# Patient Record
Sex: FEMALE | Race: BLACK OR AFRICAN AMERICAN | NOT HISPANIC OR LATINO | Employment: OTHER | URBAN - METROPOLITAN AREA
[De-identification: names, ages, dates, MRNs, and addresses within clinical notes are randomized per-mention and may not be internally consistent; named-entity substitution may affect disease eponyms.]

---

## 2017-02-06 ENCOUNTER — PRE VISIT (OUTPATIENT)
Dept: SURGERY | Facility: CLINIC | Age: 67
End: 2017-02-06

## 2017-02-06 NOTE — TELEPHONE ENCOUNTER
1.  Date/reason for appt: 2/15/17 12PM - NRB Consult  2.  Referring provider: Dr. Cuca Voss  3.  Call to patient (Yes / No - short description): No, pt is referred  4.  Previous care at / records requested from:    - [facility] -- Op-note 12/18/06 is in Epic, surgery performed by Dr. Jerry Deluca    - [facility] -- Faxed request for records

## 2017-02-07 NOTE — TELEPHONE ENCOUNTER
Referral and 1/30/17 note received from [facility], will forward to clinic.  Labs    Waiting for additional records- upper endoscopy and CT.

## 2017-02-07 NOTE — TELEPHONE ENCOUNTER
Additional records received from [facility], will forward to clinic.  Included:  Notes on 9/7/16, 10/3/16, 10/10/16, 10/26/16, 11/10/16, 11/11/16, 11/15/16, 12/15/16, 1/13/17, 1/30/17, 2/6/17  Labs   Xray chest on 5/11/16  CT enterography on 10/18/16

## 2017-02-15 ENCOUNTER — HOSPITAL ENCOUNTER (OUTPATIENT)
Facility: CLINIC | Age: 67
Setting detail: SPECIMEN
Discharge: HOME | End: 2017-02-15
Admitting: PHYSICIAN ASSISTANT
Payer: MEDICARE

## 2017-02-15 ENCOUNTER — ALLIED HEALTH/NURSE VISIT (OUTPATIENT)
Dept: SURGERY | Facility: CLINIC | Age: 67
End: 2017-02-15

## 2017-02-15 ENCOUNTER — OFFICE VISIT (OUTPATIENT)
Dept: SURGERY | Facility: CLINIC | Age: 67
End: 2017-02-15

## 2017-02-15 VITALS
HEIGHT: 62 IN | TEMPERATURE: 98.5 F | OXYGEN SATURATION: 100 % | HEART RATE: 50 BPM | SYSTOLIC BLOOD PRESSURE: 139 MMHG | BODY MASS INDEX: 31.93 KG/M2 | DIASTOLIC BLOOD PRESSURE: 84 MMHG | WEIGHT: 173.5 LBS

## 2017-02-15 DIAGNOSIS — Z98.84 BARIATRIC SURGERY STATUS: Primary | ICD-10-CM

## 2017-02-15 LAB
ALBUMIN SERPL-MCNC: 3.7 G/DL (ref 3.4–5)
ALP SERPL-CCNC: 96 U/L (ref 40–150)
ALT SERPL W P-5'-P-CCNC: 19 U/L (ref 0–50)
ANION GAP SERPL CALCULATED.3IONS-SCNC: 10 MMOL/L (ref 3–14)
AST SERPL W P-5'-P-CCNC: 24 U/L (ref 0–45)
BILIRUB SERPL-MCNC: 0.6 MG/DL (ref 0.2–1.3)
BUN SERPL-MCNC: 19 MG/DL (ref 7–30)
CALCIUM SERPL-MCNC: 9 MG/DL (ref 8.5–10.1)
CHLORIDE SERPL-SCNC: 108 MMOL/L (ref 94–109)
CHOLEST SERPL-MCNC: 185 MG/DL
CO2 SERPL-SCNC: 26 MMOL/L (ref 20–32)
CREAT SERPL-MCNC: 0.91 MG/DL (ref 0.52–1.04)
FERRITIN SERPL-MCNC: 8 NG/ML (ref 8–252)
GFR SERPL CREATININE-BSD FRML MDRD: 62 ML/MIN/1.7M2
GLUCOSE SERPL-MCNC: 80 MG/DL (ref 70–99)
HBA1C MFR BLD: 5.6 % (ref 4.3–6)
HDLC SERPL-MCNC: 74 MG/DL
HGB BLD-MCNC: 11.5 G/DL (ref 11.7–15.7)
LDLC SERPL CALC-MCNC: 101 MG/DL
NONHDLC SERPL-MCNC: 112 MG/DL
POTASSIUM SERPL-SCNC: 4.1 MMOL/L (ref 3.4–5.3)
PREALB SERPL IA-MCNC: 16 MG/DL (ref 15–45)
PROT SERPL-MCNC: 7 G/DL (ref 6.8–8.8)
PTH-INTACT SERPL-MCNC: 120 PG/ML (ref 12–72)
SODIUM SERPL-SCNC: 143 MMOL/L (ref 133–144)
TRIGL SERPL-MCNC: 55 MG/DL
VIT B12 SERPL-MCNC: 622 PG/ML (ref 193–986)

## 2017-02-15 PROCEDURE — 82306 VITAMIN D 25 HYDROXY: CPT

## 2017-02-15 PROCEDURE — 83970 ASSAY OF PARATHORMONE: CPT

## 2017-02-15 RX ORDER — CHOLECALCIFEROL (VITAMIN D3) 50 MCG
4000 TABLET ORAL
COMMUNITY
Start: 2017-01-30

## 2017-02-15 RX ORDER — ESOMEPRAZOLE MAGNESIUM 40 MG/1
40 CAPSULE, DELAYED RELEASE ORAL
COMMUNITY
Start: 2016-11-10

## 2017-02-15 RX ORDER — CYANOCOBALAMIN 1000 UG/ML
1000 INJECTION, SOLUTION INTRAMUSCULAR; SUBCUTANEOUS
COMMUNITY
Start: 2016-08-22

## 2017-02-15 RX ORDER — METOPROLOL SUCCINATE 25 MG/1
TABLET, EXTENDED RELEASE ORAL
COMMUNITY
Start: 2017-01-10

## 2017-02-15 RX ORDER — AMLODIPINE BESYLATE 10 MG/1
10 TABLET ORAL
COMMUNITY
Start: 2016-02-04

## 2017-02-15 RX ORDER — AMOXICILLIN 250 MG
CAPSULE ORAL
COMMUNITY
Start: 2016-08-22

## 2017-02-15 RX ORDER — BENZONATATE 100 MG/1
100 CAPSULE ORAL
COMMUNITY
Start: 2016-12-15

## 2017-02-15 RX ORDER — FLUTICASONE PROPIONATE 50 MCG
2 SPRAY, SUSPENSION (ML) NASAL
COMMUNITY
Start: 2016-05-11

## 2017-02-15 RX ORDER — ACETAMINOPHEN 500 MG
500-1000 TABLET ORAL
COMMUNITY
Start: 2016-08-22

## 2017-02-15 RX ORDER — AMITRIPTYLINE HYDROCHLORIDE 10 MG/1
10 TABLET ORAL
COMMUNITY
Start: 2017-01-13 | End: 2017-03-14

## 2017-02-15 RX ORDER — SUCRALFATE 1 G/1
1 TABLET ORAL
COMMUNITY
Start: 2017-01-30 | End: 2018-05-11

## 2017-02-15 NOTE — MR AVS SNAPSHOT
MRN:[id number]                      After Visit Summary   2/15/2017    Pamella Landa    MRN: [id number]           Visit Information        Provider Department      2/15/2017 11:30 AM Gaye Ng RD [facility]        Your next 10 appointments already scheduled     Feb 15, 2017 12:00 PM CST   (Arrive by 11:45 AM)   New Bariatric Revision with CHINA Tripathi [facility] Management ([facility])    [address] [place] [id number] [phone]           Do not wear perfume.              Care Instructions    Goals:  -Limit peanuts to 1/4 cup daily.    -Continue to avoid rice, bread, and milk.    -Focus on lean protein (fish, chicken breast, eggs, low-fat string cheese).  Eat a protein source 3 times/day along with a little vegetable or fruit.    -Take a daily adult dose multivitamin (official recommendations are twice daily after a gastric bypass), but if you even took it once daily you would still be better off nutrition-wise than not taking it at all).    -Check with doctor for osteoporosis before starting, but it is recommended that you take 600 mg of Calcium citrate twice daily long-term for bone health for after a gastric bypass.           [employer] Information     [employer] is an electronic gateway that provides easy, online access to your medical records. With [employer], you can request a clinic appointment, read your test results, renew a prescription or communicate with your care team.     To sign up for [employer] visit the website at [email or web address]   You will be asked to enter the access code listed below, as well as some personal information. Please follow the directions to create your username and password.     Your access code is: [email or web address]  Expires: 2017  6:31 AM     Your access code will  in 90 days. If you need help or a new code, please contact  your [facility] or call [phone] for assistance.        Care EveryWhere ID     This is your Care EveryWhere ID. This could be used by other organizations to access your [place] medical records  [id number]

## 2017-02-15 NOTE — PATIENT INSTRUCTIONS
Goals:  -Limit peanuts to 1/4 cup daily.    -Continue to avoid rice, bread, and milk.    -Focus on lean protein (fish, chicken breast, eggs, low-fat string cheese).  Eat a protein source 3 times/day along with a little vegetable or fruit.    -Take a daily adult dose multivitamin (official recommendations are twice daily after a gastric bypass), but if you even took it once daily you would still be better off nutrition-wise than not taking it at all).    -Check with doctor for osteoporosis before starting, but it is recommended that you take 600 mg of Calcium citrate twice daily long-term for bone health for after a gastric bypass.

## 2017-02-15 NOTE — LETTER
February 22, 2017      TO: Pamella Landa  [address] [place] [address]         Dear Ms. Can Cordell,    We received and reviewed your test results done.  You may receive more than one letter if we receive the results on multiple days.  Please share all lab and test results with your primary care provider and keep a copy for your own records.      Your PTH is high and Vitamin D on the lower end of normal.  I recommend increasing your Vitamin D by 2000 IU daily and recheck in 2 months    Your B1 is low.  I recommend taking a B complex daily starting asap.      If you have any questions, feel free contact us at the Call Center [phone].      Resulted Orders   Comprehensive metabolic panel [LAB17]   Result Value Ref Range    Sodium 143 133 - 144 mmol/L    Potassium 4.1 3.4 - 5.3 mmol/L    Chloride 108 94 - 109 mmol/L    Carbon Dioxide 26 20 - 32 mmol/L    Anion Gap 10 3 - 14 mmol/L    Glucose 80 70 - 99 mg/dL    Urea Nitrogen 19 7 - 30 mg/dL    Creatinine 0.91 0.52 - 1.04 mg/dL    GFR Estimate 62 >60 mL/min/1.7m2      Comment:      Non  GFR Calc    GFR Estimate If Black 75 >60 mL/min/1.7m2      Comment:       GFR Calc    Calcium 9.0 8.5 - 10.1 mg/dL    Bilirubin Total 0.6 0.2 - 1.3 mg/dL    Albumin 3.7 3.4 - 5.0 g/dL    Protein Total 7.0 6.8 - 8.8 g/dL    Alkaline Phosphatase 96 40 - 150 U/L    ALT 19 0 - 50 U/L    AST 24 0 - 45 U/L   Hemoglobin [UOE497]   Result Value Ref Range    Hemoglobin 11.5 (L) 11.7 - 15.7 g/dL   Lipid Profile [LAB18]   Result Value Ref Range    Cholesterol 185 <200 mg/dL    Triglycerides 55 <150 mg/dL    HDL Cholesterol 74 >49 mg/dL    LDL Cholesterol Calculated 101 (H) <100 mg/dL      Comment:      Above desirable:  100-129 mg/dl   Borderline High:  130-159 mg/dL   High:             160-189 mg/dL   Very high:       >189 mg/dl      Non HDL Cholesterol 112 <130 mg/dL   Ferritin [LAB68]   Result Value Ref Range    Ferritin 8 8 - 252 ng/mL    Hemoglobin A1c [LAB90]   Result Value Ref Range    Hemoglobin A1C 5.6 4.3 - 6.0 %   Parathyroid Hormone Intact [XIZ608]   Result Value Ref Range    Parathyroid Hormone Intact 120 (H) 12 - 72 pg/mL   Prealbumin [FTW218]   Result Value Ref Range    Prealbumin 16 15 - 45 mg/dL   Vitamin A [XHV426]   Result Value Ref Range    Vitamin A 0.42       Comment:      Reference range: 0.30 to 1.20  Unit: mg/L      Retinol Palmitate <0.02  Reference range: 0.00 to 0.10  Unit: mg/L       Vitamin A Interp       Normal  (Note)  Test developed and characteristics determined by [facility]. See Compliance Statement B: [employer]  Performed by [facility],  [address] [facility] [place] [address] [phone]  [email or web address], Radu Dillon MD, Lab. Director     Vitamin D [NPY551]   Result Value Ref Range    Vitamin D Deficiency screening 28 20 - 75 ug/L      Comment:      Season, race, dietary intake, and treatment affect the concentration of   25-hydroxy-Vitamin D. Values may decrease during winter months and increase   during summer months. Values 20-29 ug/L may indicate Vitamin D insufficiency   and values <20 ug/L may indicate Vitamin D deficiency.   Vitamin D determination is routinely performed by an immunoassay specific for   25 hydroxyvitamin D3.  If an individual is on vitamin D2 (ergocalciferol)   supplementation, please specify 25 OH vitamin D2 and D3 level determination   by   LCMSMS test VITD23.     Vitamin B1 (Thiamine) [WMA569]   Result Value Ref Range    Vitamin B1 Whole Blood Level 53 (L)       Comment:      Reference range: 70 to 180  Unit: nmol/L  (Note)  INTERPRETIVE INFORMATION: Vitamin B1, Whole Blood  This assay measures the concentration of thiamine  diphosphate (TDP), the primary active form of vitamin B1.  Approximately 90 percent of vitamin B1 present in whole  blood is TDP. Thiamine and thiamine monophosphate, which  comprise the remaining 10 percent, are not measured.  Test developed and  characteristics determined by [employer]. See Compliance Statement B: [employer]  Performed by [employer] [facility],  [address] [place] [address] [phone]  www.[email or web address], Radu Dillon MD, Lab. Director     Vitamin B12 [LAB67]   Result Value Ref Range    Vitamin B12 622 193 - 986 pg/mL           Sincerely,      Amrita Adair PA-C

## 2017-02-15 NOTE — PROGRESS NOTES
"Hx of gastric bypass with Dr Deluca in 2005.  She has had epigastric pain that started last year.  PCP discontinued her ibuprofen and ASA.  She does not smoke.  She saw Dr Cochran and she has EGD scheduled soon. Pain is intermittent.  Describes it as burning sensation.   Pain is worse with some foods but sometimes food helps the pain.  Sulcralfate is helping. She is also on PPI    Dr Mcmahan [facility] GI 2/6/17:  \"She underwent a CT of abdomen and pelvis on 07/08/2016 showing duodenal stump thickening. 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 She underwent an EGD which revealed Valdemar-en-Y anatomy, otherwise unremarkable. She had a repeat CT showing very minimal improvement of the thickening of the duodenum. Her prealbumin was low. She underwent upper GI with a small bowel follow-through. Per the records, small bowel follow-through did not show anything to explain her symptoms.\"        EGD with Dr Cochran as planned.    Bariatric labs today    Continue sulcrafate and omeprazole twice daily    Follow up Amrita Adair in 1-2 months for LUIS Adair PACONNIE          "

## 2017-02-15 NOTE — PATIENT INSTRUCTIONS
EGD with Dr Cochran as planned.     Bariatric labs today     Continue sulcrafate and omeprazole twice daily     Follow up Amrita Adair in 1-2 months for RBS

## 2017-02-15 NOTE — NURSING NOTE
"(   Chief Complaint   Patient presents with     Consult     NRB, gastric bypass    )    ( Weight: 173 lb 8 oz )  ( Height: 5' 2\" )  ( BMI (Calculated): 31.8 )  ( Seminar Weight: 325 lb )  ( Seminar Wt Minus Current Wt (lbs): 151.5 )  ( Last Visits Weight: 174 lb 11.2 oz )  ( Change from Last Visit Weight (lbs): -1.2 )  (   )  (   )    ( BP: 139/84 )  ( Site: Arm, upper left )  ( Temp: 98.5  F (36.9  C) )  ( Temp src: Oral )  ( Pulse: 50 )  (   )  ( SpO2: 100 % )    ( There is no problem list on file for this patient.   )  (   Current Outpatient Prescriptions   Medication Sig Dispense Refill     acetaminophen (TYLENOL) 500 MG tablet Take 500-1,000 mg by mouth       amitriptyline (ELAVIL) 10 MG tablet Take 10 mg by mouth       amLODIPine (NORVASC) 10 MG tablet Take 10 mg by mouth       benzonatate (TESSALON) 100 MG capsule Take 100 mg by mouth       calcium carb 1250 mg, 500 mg [place],/vitamin D 200 units (OSCAL WITH D) 500-200 MG-UNIT per tablet        cetirizine HCl 10 MG CAPS        Cholecalciferol (VITAMIN D) 2000 UNITS tablet Take 4,000 Units by mouth       cyanocobalamin (VITAMIN B12) 1000 MCG/ML injection Inject 1,000 mcg into the muscle       esomeprazole (NEXIUM) 40 MG CR capsule Take 40 mg by mouth       fluticasone (FLONASE) 50 MCG/ACT spray 2 sprays       metoprolol (TOPROL-XL) 25 MG 24 hr tablet TAKE 1/2 TABLET BY MOUTH EVERY DAY       omeprazole (PRILOSEC) 20 MG CR capsule Take 20 mg by mouth       senna-docusate (SENOKOT-S;PERICOLACE) 8.6-50 MG per tablet Take 1 tab by mouth twice daily as needed for constipation       sucralfate (CARAFATE) 1 GM tablet Take 1 g by mouth      )  ( Diabetes Eval:    )    ( Pain Eval:  Data Unavailable )    ( Wound Eval:       )    (   History   Smoking Status     Never Smoker   Smokeless Tobacco     Not on file    )    ( Signed By:  Wily Cuevas; February 15, 2017; 11:54 AM )    "

## 2017-02-15 NOTE — PROGRESS NOTES
"Nutrition Assessment  Reason For Visit:  Pamella Landa is a 66 year-old female presents today for nutrition follow-up, s/p RNY GB in 2005 with Dr. Deluca.    Anthropometrics:    Height as of an earlier encounter on 2/15/17: 1.575 m (5' 2\").    Weight as of an earlier encounter on 2/15/17: 78.7 kg (173 lb 8 oz) with BMI of 31.73.    Pt stated that she has been gaining weight despite eating less food as a result of epigastric pain issues for which she has been hospitalized in the past.     Pre-op Weight: 325 lbs per Pt report  Lowest Post-Op Weight:150 lbs per Pt report  Current: 173.5 lbs with BMI of 31.73.    Current Vitamins/Minerals: Vitamin B12 injection monthly  *Pt stated she takes a green pill prescribed for osteoporosis twice daily, though she doesn't remember the name of it.     Pt is not taking MVI BID nor 600 mg Ca Citrate BID, which are recommended by ASMBS post-RNY GB.    Nutrition History:  Pt c/o poor appetite.   Pt seems to have symptoms consistent to lactose intolerance as she stated diarrhea occurs after drinking milk, but not after cheese.   Pt stated that she does not have very much rice (2 Tbsp if that) due to intolerance, which is considered normal after surgery.    Diet Recall:   AM: 1 c coffee + 1 banana  Lunch: mostly skip; occasionally eggs or steamed/baked chicken  Dinner: oatmeal; occasionally broccoli and cauliflower; occasionally eggs  Snack: peanuts as a snack all day long    Physical Activity:  Types of Activities: walking  Frequency: varies on how many days/week depending upon how good the weather is  Duration: 20-30 min    Nutrition Prescription:  Grams Protein: 60 (minimum)  Amount of Fluid: 48-64 oz    Nutrition Diagnosis  Potential for inadequate nutrient intake (protein, vitamin/mineral) related to Pt report of poor appetite, focusing on lower-protein foods, and not taking MVI and Ca as recommended post-RNY GB.    Intervention  Intervention At " Appointment:  Materials/Education provided on optimizing protein intake and vitamin/mineral supplementation guidelines post-RNY GB.  List of goals provided.     Goals:  -Limit peanuts to 1/4 cup daily.  -Continue to avoid rice, bread, and milk.  -Focus on lean protein (fish, chicken breast, eggs, low-fat string cheese).  Eat a protein source 3 times/day along with a little vegetable or fruit.  -Take a daily adult dose multivitamin (official recommendations are twice daily after a gastric bypass), but if you even took it once daily you would still be better off nutrition-wise than not taking it at all).  -Check with doctor for osteoporosis before starting, but it is recommended that you take 600 mg of Calcium citrate twice daily long-term for bone health for after a gastric bypass.    Follow-Up: PRN    Time spent with patient: 15 minutes.  Gaye Ng, RD, LD, CLT

## 2017-02-15 NOTE — LETTER
"2/15/2017       RE: Pamella Landa  [address]  [place] [address]     Dear Colleague,    Thank you for referring your patient, Pamella Landa, to the [facility] MANAGEMENT at [facility]. Please see a copy of my visit note below.    Hx of gastric bypass with Dr Deluca in 2005.  She has had epigastric pain that started last year.  PCP discontinued her ibuprofen and ASA.  She does not smoke.  She saw Dr Cochran and she has EGD scheduled soon. Pain is intermittent.  Describes it as burning sensation.   Pain is worse with some foods but sometimes food helps the pain.  Sulcralfate is helping. She is also on PPI    Dr Mcmahan [facility] 2/6/17:  \"She underwent a CT of abdomen and pelvis on 07/08/2016 showing duodenal stump thickening. 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 She underwent an EGD which revealed Valdemar-en-Y anatomy, otherwise unremarkable. She had a repeat CT showing very minimal improvement of the thickening of the duodenum. Her prealbumin was low. She underwent upper GI with a small bowel follow-through. Per the records, small bowel follow-through did not show anything to explain her symptoms.\"        EGD with Dr Cochran as planned.    Bariatric labs today    Continue sulcrafate and omeprazole twice daily    Follow up Amrita Adair in 1-2 months for LUIS Adair PA-C        "

## 2017-02-15 NOTE — MR AVS SNAPSHOT
After Visit Summary   2/15/2017    Pamella Landa    MRN: [id number]           Patient Information     Date Of Birth          1950        Visit Information        Provider Department      2/15/2017 12:00 PM Amrita Adair PA-C [facility]        Today's Diagnoses     Bariatric surgery status    -  1      Care Instructions    EGD with Dr Cochran as planned.     Bariatric labs today     Continue sulcrafate and omeprazole twice daily     Follow up Amrita Adair in 1-2 months for RBS        Follow-ups after your visit        Who to contact     Please call your clinic at [phone] to:    Ask questions about your health    Make or cancel appointments    Discuss your medicines    Learn about your test results    Speak to your doctor   If you have compliments or concerns about an experience at your clinic, or if you wish to file a complaint, please contact [facility] Patient Relations at [phone] or email us at [email or web address]         Additional Information About Your Visit        MyChart Information     [employer] is an electronic gateway that provides easy, online access to your medical records. With [employer], you can request a clinic appointment, read your test results, renew a prescription or communicate with your care team.     To sign up for [employer] visit the website at [email or web address]   You will be asked to enter the access code listed below, as well as some personal information. Please follow the directions to create your username and password.     Your access code is: [email or web address]  Expires: 2017  6:31 AM     Your access code will  in 90 days. If you need help or a new code, please contact your [facility] or call [phone] for assistance.        Care EveryWhere ID     This is your Care EveryWhere ID. This could be used by other organizations to access your  "[place] medical records  [id number]        Your Vitals Were     Pulse Temperature Height Pulse Oximetry BMI (Body Mass Index)       50 98.5  F (36.9  C) (Oral) 5' 2\" 100% 31.73 kg/m2        Blood Pressure from Last 3 Encounters:   02/15/17 139/84   08/13/14 129/72    Weight from Last 3 Encounters:   02/15/17 173 lb 8 oz   08/13/14 174 lb 11.2 oz              We Performed the Following     Comprehensive metabolic panel [LAB17]     Ferritin [LAB68]     Hemoglobin A1c [LAB90]     Hemoglobin [YAE623]     Lipid Profile [LAB18]     Parathyroid Hormone Intact [VNE262]     Prealbumin [TQS362]     Vitamin A [EXR707]     Vitamin B1 (Thiamine) [CXN164]     Vitamin B12 [LAB67]     Vitamin D [BIK123]        Primary Care Provider Office Phone # Fax #    Cuca Voss [phone]       [facility] FOR [address] [address]        Thank you!     Thank you for choosing [facility] SURGICAL WEIGHT MANAGEMENT  for your care. Our goal is always to provide you with excellent care. Hearing back from our patients is one way we can continue to improve our services. Please take a few minutes to complete the written survey that you may receive in the mail after your visit with us. Thank you!             Your Updated Medication List - Protect others around you: Learn how to safely use, store and throw away your medicines at www.disposemymeds.org.          This list is accurate as of: 2/15/17 12:20 PM.  Always use your most recent med list.                   Brand Name Dispense Instructions for use    acetaminophen 500 MG tablet    TYLENOL     Take 500-1,000 mg by mouth       amitriptyline 10 MG tablet    ELAVIL     Take 10 mg by mouth       amLODIPine 10 MG tablet    NORVASC     Take 10 mg by mouth       benzonatate 100 MG capsule    TESSALON     Take 100 mg by mouth       calcium carb 1250 mg (500 mg [place])/vitamin D 200 units 500-200 MG-UNIT per tablet    OSCAL with D         " cetirizine HCl 10 MG Caps          cyanocobalamin 1000 MCG/ML injection    VITAMIN B12     Inject 1,000 mcg into the muscle       esomeprazole 40 MG CR capsule    nexIUM     Take 40 mg by mouth       fluticasone 50 MCG/ACT spray    FLONASE     2 sprays       metoprolol 25 MG 24 hr tablet    TOPROL-XL     TAKE 1/2 TABLET BY MOUTH EVERY DAY       omeprazole 20 MG CR capsule    priLOSEC     Take 20 mg by mouth       senna-docusate 8.6-50 MG per tablet    SENOKOT-S;PERICOLACE     Take 1 tab by mouth twice daily as needed for constipation       sucralfate 1 GM tablet    CARAFATE     Take 1 g by mouth       vitamin D 2000 UNITS tablet      Take 4,000 Units by mouth

## 2017-02-16 LAB — DEPRECATED CALCIDIOL+CALCIFEROL SERPL-MC: 28 UG/L (ref 20–75)

## 2017-02-17 LAB
ANNOTATION COMMENT IMP: NORMAL
RETINYL PALMITATE SERPL-MCNC: NORMAL UG/ML
VIT A SERPL-MCNC: 0.42 UG/ML

## 2017-02-18 LAB — VIT B1 BLD-MCNC: 53 UG/DL

## 2017-02-23 ENCOUNTER — TELEPHONE (OUTPATIENT)
Dept: SURGERY | Facility: CLINIC | Age: 67
End: 2017-02-23

## 2017-02-23 NOTE — TELEPHONE ENCOUNTER
Called patient per provider request. Advised her to take B complex vitamin daily to help increase her levels of Vitamin B.

## 2017-03-27 ENCOUNTER — CARE COORDINATION (OUTPATIENT)
Dept: SURGERY | Facility: CLINIC | Age: 67
End: 2017-03-27

## 2017-03-31 ENCOUNTER — OFFICE VISIT (OUTPATIENT)
Dept: SURGERY | Facility: CLINIC | Age: 67
End: 2017-03-31

## 2017-03-31 ENCOUNTER — ALLIED HEALTH/NURSE VISIT (OUTPATIENT)
Dept: SURGERY | Facility: CLINIC | Age: 67
End: 2017-03-31

## 2017-03-31 VITALS
HEART RATE: 54 BPM | TEMPERATURE: 98.3 F | WEIGHT: 177.9 LBS | SYSTOLIC BLOOD PRESSURE: 147 MMHG | OXYGEN SATURATION: 100 % | DIASTOLIC BLOOD PRESSURE: 79 MMHG | HEIGHT: 62 IN | BODY MASS INDEX: 32.74 KG/M2

## 2017-03-31 VITALS — HEIGHT: 62 IN | WEIGHT: 177.9 LBS | BODY MASS INDEX: 32.74 KG/M2

## 2017-03-31 DIAGNOSIS — Z98.84 BARIATRIC SURGERY STATUS: Primary | ICD-10-CM

## 2017-03-31 RX ORDER — THIAMINE HYDROCHLORIDE 100 MG/ML
100 INJECTION, SOLUTION INTRAMUSCULAR; INTRAVENOUS ONCE
Qty: 1 ML | Refills: 0
Start: 2017-03-31 | End: 2017-03-31

## 2017-03-31 ASSESSMENT — ENCOUNTER SYMPTOMS
DYSPNEA ON EXERTION: 0
EYE IRRITATION: 1
EYE WATERING: 1
FEVER: 0
MUSCLE WEAKNESS: 0
LOSS OF CONSCIOUSNESS: 0
MYALGIAS: 1
PANIC: 0
DOUBLE VISION: 0
ARTHRALGIAS: 1
HALLUCINATIONS: 0
EYE PAIN: 1
SEIZURES: 0
DECREASED CONCENTRATION: 0
HEMOPTYSIS: 0
INCREASED ENERGY: 1
COUGH: 1
DEPRESSION: 1
NIGHT SWEATS: 0
HOARSE VOICE: 0
SINUS PAIN: 0
BLOATING: 1
WHEEZING: 0
TREMORS: 0
POLYDIPSIA: 0
BACK PAIN: 1
TASTE DISTURBANCE: 1
CONSTIPATION: 1
SMELL DISTURBANCE: 0
SINUS CONGESTION: 0
RECTAL BLEEDING: 0
WEIGHT GAIN: 1
SNORES LOUDLY: 0
HEADACHES: 1
COUGH DISTURBING SLEEP: 0
RECTAL PAIN: 0
NECK MASS: 0
FATIGUE: 1
CHILLS: 0
HEARTBURN: 0
ALTERED TEMPERATURE REGULATION: 1
TROUBLE SWALLOWING: 0
DIARRHEA: 0
WEIGHT LOSS: 0
ABDOMINAL PAIN: 1
POSTURAL DYSPNEA: 0
NUMBNESS: 1
POLYPHAGIA: 0
BOWEL INCONTINENCE: 0
DIZZINESS: 1
NAUSEA: 0
JAUNDICE: 0
SORE THROAT: 1
EYE REDNESS: 0
INSOMNIA: 1
NECK PAIN: 0
VOMITING: 0
WEAKNESS: 1
MUSCLE CRAMPS: 1
SHORTNESS OF BREATH: 0
DECREASED APPETITE: 0
DISTURBANCES IN COORDINATION: 0
STIFFNESS: 1
MEMORY LOSS: 0
BLOOD IN STOOL: 0
NERVOUS/ANXIOUS: 0
TINGLING: 1
SPUTUM PRODUCTION: 0
JOINT SWELLING: 0
SPEECH CHANGE: 0

## 2017-03-31 NOTE — PROGRESS NOTES
"Nutrition Reassessment  Reason For Visit:  Pamella Landa is a 67 year-old female presents today for nutrition follow-up, s/p RNY GRACE in 2005 with Dr. Deluca.  Pt referred by Amrita ALMAGUER), (3/31/17).    Anthropometrics:  Height: 62\"  Pre-op Weight: 325 lbs per Pt report  Lowest Post-Op Weight:150 lbs per Pt report    Weight as of an earlier encounter on 2/15/17: 78.7 kg (173.5 lbs) with BMI of 31.73.  Current Weight: 177.9 lbs (up 4.4 lbs over the past month)     *Pt continues to report that she has been gaining weight despite eating less food as a result of epigastric pain issues for which she has been hospitalized in the past.     Current Vitamins/Minerals: MVI BID, 600 mg Ca Citrate BID, Vitamin B12 injection monthly  *Pt stated she takes a green pill prescribed for osteoporosis twice daily, though she doesn't remember the name of it.     Nutrition History:  Pt c/o poor appetite. Her stomach hurts if she hasn't eaten for awhile per Pt report.   Pt seems to have symptoms consistent to lactose intolerance as she stated diarrhea occurs after drinking milk, but not after cheese.   Pt stated that she does not have very much rice (2 Tbsp if that) due to intolerance, which is considered normal after surgery.    Progress with Previous Goals:  -Limit peanuts to 1/4 cup daily. - Pt stopped eating nuts altogether.   -Continue to avoid rice, bread, and milk.- Meeting.   -Focus on lean protein (fish, chicken breast, eggs, low-fat string cheese).  Eat a protein source 3 times/day along with a little vegetable or fruit.- Not meeting due to poor appetite. She sometimes skips lunch.  She does not have protein at breakfast.   -Take a daily adult dose multivitamin (official recommendations are twice daily after a gastric bypass), but if you even took it once daily you would still be better off nutrition-wise than not taking it at all).- Meeting.   -Check with doctor for osteoporosis before starting, but it is recommended " that you take 600 mg of Calcium citrate twice daily long-term for bone health for after a gastric bypass.- Meeting.     Diet Recall:   AM: 1 c coffee + 1 banana  Lunch: mostly skip; occasionally eggs or steamed/baked chicken  Dinner: a fruit or a few grapes, raw vegetables (carrots, broccoli, cauliflower, celery) - Pt stated raw vegetables cause her no GI distress.   Snack: fruit (1 tangerine or orange)  Beverages: diet coke, water  (Diet Coke seems to settle her stomach per Pt report.)    Physical Activity:  Types of Activities: walking  Frequency: almost ever day (outside or in the store if too cold)  Duration: 20-30 min    Nutrition Prescription:  Grams Protein: 60 (minimum)  Amount of Fluid: 48-64 oz    Nutrition Diagnosis  Previous: Potential for inadequate nutrient intake (protein, vitamin/mineral) related to Pt report of poor appetite, focusing on lower-protein foods, and not taking MVI and Ca as recommended post-RNY GB.-not current  Current: Inadequate protein intake related to lack of appetite as evidenced by Pt report of poor protein intake (<50 g daily).    Intervention  Intervention At Appointment:  Materials/Education provided on optimizing protein intake, review of goals, attempted to determine any extra caloric intake she may not be recalling, review of guidelines post-RNY GB.  List of goals provided.  Gave encouragement and support.     Goals:  -Try a protein shake meal replacement if not hungry for a meal.  Try Muscle Milk (lactose-free).  You may try other lactose-free ones as long as they meet the following criteria: 200 or less calories, no more than 10 grams of sugar, and at least 20 grams of protein.   -Continue to avoid rice, bread, and milk.  -Focus on lean protein (fish, chicken breast, eggs, low-fat string cheese).  Eat a protein source 3 times/day along with a little vegetable or fruit.  -Continue to take your vitamin/mineral supplementation as recommended.     Follow-Up: PRN    Time spent  with patient: 30 minutes.  Gaye Ng MS, RDN, LDN, CLT  Pager: [phone]

## 2017-03-31 NOTE — MR AVS SNAPSHOT
After Visit Summary   3/31/2017    Pamella Landa    MRN: [id number]           Patient Information     Date Of Birth          1950        Visit Information        Provider Department      3/31/2017 1:45 PM Amrita Adair PA-C M [facility]        Today's Diagnoses     Bariatric surgery status    -  1       Follow-ups after your visit        Your next 10 appointments already scheduled     Mar 31, 2017  1:45 PM CDT   (Arrive by 1:30 PM)   RETURN BARIATRIC SURGERY with CHINA Tripathi [facility] ([facility])    [address]  [address] [facility] [address] [phone]              Who to contact     Please call your clinic at [phone] to:    Ask questions about your health    Make or cancel appointments    Discuss your medicines    Learn about your test results    Speak to your doctor   If you have compliments or concerns about an experience at your clinic, or if you wish to file a complaint, please contact [facility] Patient Relations at [phone] or email us at [email or web address]         Additional Information About Your Visit        MyChart Information     [employer] is an electronic gateway that provides easy, online access to your medical records. With [employer], you can request a clinic appointment, read your test results, renew a prescription or communicate with your care team.     To sign up for [employer] visit the website at [email or web address]   You will be asked to enter the access code listed below, as well as some personal information. Please follow the directions to create your username and password.     Your access code is: [email or web address]  Expires: 2017  7:31 AM     Your access code will  in 90 days. If you need help or a new code, please contact your [facility] or call [phone] for  "assistance.        Care EveryWhere ID     This is your Care EveryWhere ID. This could be used by other organizations to access your [place] medical records  [id number]        Your Vitals Were     Pulse Temperature Height Pulse Oximetry BMI (Body Mass Index)       54 98.3  F (36.8  C) (Oral) 5' 2\" 100% 32.54 kg/m2        Blood Pressure from Last 3 Encounters:   03/31/17 147/79   02/15/17 139/84   08/13/14 129/72    Weight from Last 3 Encounters:   03/31/17 177 lb 14.4 oz   03/31/17 177 lb 14.4 oz   02/15/17 173 lb 8 oz              Today, you had the following     No orders found for display         Today's Medication Changes          These changes are accurate as of: 3/31/17  1:31 PM.  If you have any questions, ask your nurse or doctor.               Start taking these medicines.        Dose/Directions    thiamine 100 MG/ML injection   Commonly known as:  B-1   Used for:  Bariatric surgery status   Started by:  Amrita Adair PA-C        Dose:  100 mg   Inject 1 mL (100 mg) into the muscle once for 1 dose   Quantity:  1 mL   Refills:  0            Where to get your medicines      Some of these will need a paper prescription and others can be bought over the counter.  Ask your nurse if you have questions.     You don't need a prescription for these medications     thiamine 100 MG/ML injection                Primary Care Provider Office Phone # Fax #    Cuca Voss [phone]       [facility] FOR [address] [address]        Thank you!     Thank you for choosing [facility]NAG[facility]  for your care. Our goal is always to provide you with excellent care. Hearing back from our patients is one way we can continue to improve our services. Please take a few minutes to complete the written survey that you may receive in the mail after your visit with us. Thank you!             Your Updated Medication List - Protect others around you: " Learn how to safely use, store and throw away your medicines at www.disposemymeds.org.          This list is accurate as of: 3/31/17  1:31 PM.  Always use your most recent med list.                   Brand Name Dispense Instructions for use    acetaminophen 500 MG tablet    TYLENOL     Take 500-1,000 mg by mouth       amitriptyline 10 MG tablet    ELAVIL     Take 10 mg by mouth       amLODIPine 10 MG tablet    NORVASC     Take 10 mg by mouth       benzonatate 100 MG capsule    TESSALON     Take 100 mg by mouth       calcium carb 1250 mg (500 mg [place])/vitamin D 200 units 500-200 MG-UNIT per tablet    OSCAL with D         cetirizine HCl 10 MG Caps          cyanocobalamin 1000 MCG/ML injection    VITAMIN B12     Inject 1,000 mcg into the muscle       esomeprazole 40 MG CR capsule    nexIUM     Take 40 mg by mouth       fluticasone 50 MCG/ACT spray    FLONASE     2 sprays       metoprolol 25 MG 24 hr tablet    TOPROL-XL     TAKE 1/2 TABLET BY MOUTH EVERY DAY       omeprazole 20 MG CR capsule    priLOSEC     Take 20 mg by mouth       senna-docusate 8.6-50 MG per tablet    SENOKOT-S;PERICOLACE     Take 1 tab by mouth twice daily as needed for constipation       sucralfate 1 GM tablet    CARAFATE     Take 1 g by mouth       thiamine 100 MG/ML injection    B-1    1 mL    Inject 1 mL (100 mg) into the muscle once for 1 dose       vitamin D 2000 UNITS tablet      Take 4,000 Units by mouth

## 2017-03-31 NOTE — PATIENT INSTRUCTIONS
Goals:  -Try a protein shake meal replacement if not hungry for a meal.  Try Muscle Milk (lactose-free).  You may try other lactose-free ones as long as they meet the following criteria: 200 or less calories, no more than 10 grams of sugar, and at least 20 grams of protein.   -Continue to avoid rice, bread, and milk.  -Focus on lean protein (fish, chicken breast, eggs, low-fat string cheese).  Eat a protein source 3 times/day along with a little vegetable or fruit.  -Continue to take your vitamin/mineral supplementation as recommended.

## 2017-03-31 NOTE — MR AVS SNAPSHOT
MRN:[id number]                      After Visit Summary   3/31/2017    Pamella Landa    MRN: [id number]           Visit Information        Provider Department      3/31/2017 1:00 PM Gaye Ng RD M [facility]        Your next 10 appointments already scheduled     Mar 31, 2017  1:00 PM CDT   NUTRITION VISIT with SHEILA Borja [facility] ([facility])    [address] [place] [address]   [phone]            Mar 31, 2017  1:45 PM CDT   (Arrive by 1:30 PM)   RETURN BARIATRIC SURGERY with CHINA Tripathi [facility] ([facility])    [address] [place] [id number] [phone]              Care Instructions    Goals:  -Try a protein shake meal replacement if not hungry for a meal.  Try Muscle Milk (lactose-free).  You may try other lactose-free ones as long as they meet the following criteria: 200 or less calories, no more than 10 grams of sugar, and at least 20 grams of protein.   -Continue to avoid rice, bread, and milk.  -Focus on lean protein (fish, chicken breast, eggs, low-fat string cheese).  Eat a protein source 3 times/day along with a little vegetable or fruit.  -Continue to take your vitamin/mineral supplementation as recommended.        SinglePlatform Information     SinglePlatform is an electronic gateway that provides easy, online access to your medical records. With SinglePlatform, you can request a clinic appointment, read your test results, renew a prescription or communicate with your care team.     To sign up for SinglePlatform visit the website at [email or web address]   You will be asked to enter the access code listed below, as well as some personal information. Please follow the directions to create your username and password.     Your access code is: [email or web address]  Expires:  2017  7:31 AM     Your access code will  in 90 days. If you need help or a new code, please contact your [facility] or call [phone] for assistance.        Care EveryWhere ID     This is your Care EveryWhere ID. This could be used by other organizations to access your [place] medical records  [id number]

## 2017-03-31 NOTE — NURSING NOTE
The following medication was given:     MEDICATION: Thiamine HCL  ROUTE: IM  SITE: Arm - Left  DOSE: 1cc  LOT #: [id number]  :  [employer]  EXPIRATION DATE:  03/2018  NDC#: 82576-025-92    Patient tolerated injection well with no complaints.  Wily Cuevas CMA

## 2017-03-31 NOTE — PROGRESS NOTES
"Return Bariatric Surgery Note    RE: Pamella Landa  MR#: [id number]  : 1950  VISIT DATE: Mar 31, 2017    Dear Cuca Voss,    I had the pleasure of seeing your patient, Pamella Landa, in my post-bariatric surgery assessment clinic.    CHIEF COMPLAINT: Post-bariatric surgery follow-up    Last visit note: \"Hx of gastric bypass with Dr Deluca in . She has had epigastric pain that started last year. PCP discontinued her ibuprofen and ASA. She does not smoke. She saw Dr Cochran and she has EGD scheduled soon. Pain is intermittent. Describes it as burning sensation. Pain is worse with some foods but sometimes food helps the pain. Sulcralfate is helping. She is also on PPI\"     [facility]  Impression:          - Normal esophagus.                       - Z-line regular, 36 cm from the incisors.                       - Gastric bypass with a normal-sized pouch and                        disrupted staple line. Gastrojejunal anastomosis                        characterized by healthy appearing mucosa.                       - The BRAVO pH capsule was deployed.                       - No specimens collected.  Recommendation:      - BRAVO protocol                       return to referring provider as planned.    Her pain has improved significantly.  Still taking omeprazole and carafate. Her appetite is still decreased.    HISTORY OF PRESENT ILLNESS:  Questions Regarding Prior Weight Loss Surgery Reviewed With Patient 3/31/2017   I had the following weight loss procedure: Valdemar-en-y Gastric Bypass   What year was your surgery? 11 years ago   How has your weight changed since your last visit? I have gained weight   Are you currently taking any weight loss medications? No     Weight History:     3/31/2017   What is your highest lifetime weight? 325   What is your lowest weight since surgery? (In pounds) 170     Initial Weight: 325 lb  Today's Weight:  Weight: 177 lb 14.4 " oz  Cumulative weight loss (lbs): 147.1   Wt Readings from Last 4 Encounters:   03/31/17 177 lb 14.4 oz   03/31/17 177 lb 14.4 oz   02/15/17 173 lb 8 oz   08/13/14 174 lb 11.2 oz         Questions Regarding Co-Morbidities and Health Concerns Reviewed With Patient 3/31/2017   Diabetes II: Improved   High Blood Pressure: Improved   High cholesterol: Improved   Heartburn/Reflux: Improved   Sleep apnea: Improved   Back pain: Improved   Joint pain: Improved   Lower leg swelling: Improved       Eating Habits 3/31/2017   How many meals do you eat per day? 2   Do you snack between meals? Yes   How much food are you eating at each meal? Less than 1/2 cup   Are you able to separate your meals and liquids by at least 30 minutes? Sometimes   Are you able to avoid liquid calories? Sometimes       Exercise Questions Reviewed With Patient 3/31/2017   How often do you exercise? 3 to 4 times per week   What is the duration of your exercise (in minutes)? 30 Minutes   What types of exercise do you do? walking   What keeps you from being more active?  Pain, My ability to walk or move around is limited       Social History:      3/31/2017   Are you smoking? No   Are you drinking alcohol? No       Medications:  Current Outpatient Prescriptions   Medication     acetaminophen (TYLENOL) 500 MG tablet     amLODIPine (NORVASC) 10 MG tablet     benzonatate (TESSALON) 100 MG capsule     calcium carb 1250 mg, 500 mg [place],/vitamin D 200 units (OSCAL WITH D) 500-200 MG-UNIT per tablet     cetirizine HCl 10 MG CAPS     Cholecalciferol (VITAMIN D) 2000 UNITS tablet     cyanocobalamin (VITAMIN B12) 1000 MCG/ML injection     esomeprazole (NEXIUM) 40 MG CR capsule     fluticasone (FLONASE) 50 MCG/ACT spray     metoprolol (TOPROL-XL) 25 MG 24 hr tablet     omeprazole (PRILOSEC) 20 MG CR capsule     senna-docusate (SENOKOT-S;PERICOLACE) 8.6-50 MG per tablet     sucralfate (CARAFATE) 1 GM tablet     amitriptyline (ELAVIL) 10 MG tablet     No current  "facility-administered medications for this visit.          3/31/2017   Do you avoid NSAIDs such as (Ibuprofen, Aleve, Naproxen, Advil)?   Yes       ROS:     3/31/2017   Vomiting: No   Diarrhea: No   Constipation: Yes   Swallowing trouble: No   Abdominal pain: Yes   Heartburn: No   Rash in skin folds: No   Depression: Yes       PHYSICAL EXAMINATION:  /79  Pulse 54  Temp 98.3  F (36.8  C) (Oral)  Ht 5' 2\"  Wt 177 lb 14.4 oz  SpO2 100%  BMI 32.54 kg/m2       ASSESSMENT AND PLAN:      1. 11 years status laparoscopic gastric bypass  2. Morbid Obesity current BMI: Body mass index is 32.54 kg/(m^2).  3. Post surgical malabsorption:   Labs ordered per protocol.   Follow food plan per dietitian recommendations.   Continue taking recommended post-op vitamins.  4. Return to clinic in 2 months.  5. Increase Vitamin D by 2000 IU  6. B1 injection today in clinic  7. Abd pain improved.  EGD and pH Bravo normal.        Sincerely,    Amrita Adair PA-C    I spent a total of 15 minutes face to face with (patient name dot phrase) during today's office visit. Over 50% of this time was spent counseling the patient and/or coordinating care.  "

## 2017-03-31 NOTE — LETTER
"3/31/2017       RE: Pamella Landa  [address]  [place] [address]     Dear Colleague,    Thank you for referring your patient, Pamella Landa, to the [facility] at [facility]. Please see a copy of my visit note below.    Return Bariatric Surgery Note    RE: Pamella Landa  MR#: [id number]  : 1950  VISIT DATE: Mar 31, 2017    Dear Cuca Voss,    I had the pleasure of seeing your patient, Pamella Landa, in my post-bariatric surgery assessment clinic.    CHIEF COMPLAINT: Post-bariatric surgery follow-up    Last visit note: \"Hx of gastric bypass with Dr Deluca in . She has had epigastric pain that started last year. PCP discontinued her ibuprofen and ASA. She does not smoke. She saw Dr Cochran and she has EGD scheduled soon. Pain is intermittent. Describes it as burning sensation. Pain is worse with some foods but sometimes food helps the pain. Sulcralfate is helping. She is also on PPI\"     Recent EGD and PH Bravo:  [facility]  Impression:          - Normal esophagus.                       - Z-line regular, 36 cm from the incisors.                       - Gastric bypass with a normal-sized pouch and                        disrupted staple line. Gastrojejunal anastomosis                        characterized by healthy appearing mucosa.                       - The BRAVO pH capsule was deployed.                       - No specimens collected.  Recommendation:      - BRAVO protocol                       return to referring provider as planned.    Her pain has improved significantly.  Still taking omeprazole and carafate. Her appetite is still decreased.    HISTORY OF PRESENT ILLNESS:  Questions Regarding Prior Weight Loss Surgery Reviewed With Patient 3/31/2017   I had the following weight loss procedure: Valdemar-en-y Gastric Bypass   What year was your surgery? 11 years ago   How has your weight changed since your last " visit? I have gained weight   Are you currently taking any weight loss medications? No     Weight History:     3/31/2017   What is your highest lifetime weight? 325   What is your lowest weight since surgery? (In pounds) 170     Initial Weight: 325 lb  Today's Weight:  Weight: 177 lb 14.4 oz  Cumulative weight loss (lbs): 147.1   Wt Readings from Last 4 Encounters:   03/31/17 177 lb 14.4 oz   03/31/17 177 lb 14.4 oz   02/15/17 173 lb 8 oz   08/13/14 174 lb 11.2 oz         Questions Regarding Co-Morbidities and Health Concerns Reviewed With Patient 3/31/2017   Diabetes II: Improved   High Blood Pressure: Improved   High cholesterol: Improved   Heartburn/Reflux: Improved   Sleep apnea: Improved   Back pain: Improved   Joint pain: Improved   Lower leg swelling: Improved       Eating Habits 3/31/2017   How many meals do you eat per day? 2   Do you snack between meals? Yes   How much food are you eating at each meal? Less than 1/2 cup   Are you able to separate your meals and liquids by at least 30 minutes? Sometimes   Are you able to avoid liquid calories? Sometimes       Exercise Questions Reviewed With Patient 3/31/2017   How often do you exercise? 3 to 4 times per week   What is the duration of your exercise (in minutes)? 30 Minutes   What types of exercise do you do? walking   What keeps you from being more active?  Pain, My ability to walk or move around is limited       Social History:      3/31/2017   Are you smoking? No   Are you drinking alcohol? No       Medications:  Current Outpatient Prescriptions   Medication     acetaminophen (TYLENOL) 500 MG tablet     amLODIPine (NORVASC) 10 MG tablet     benzonatate (TESSALON) 100 MG capsule     calcium carb 1250 mg, 500 mg [place],/vitamin D 200 units (OSCAL WITH D) 500-200 MG-UNIT per tablet     cetirizine HCl 10 MG CAPS     Cholecalciferol (VITAMIN D) 2000 UNITS tablet     cyanocobalamin (VITAMIN B12) 1000 MCG/ML injection     esomeprazole (NEXIUM) 40 MG CR capsule      "fluticasone (FLONASE) 50 MCG/ACT spray     metoprolol (TOPROL-XL) 25 MG 24 hr tablet     omeprazole (PRILOSEC) 20 MG CR capsule     senna-docusate (SENOKOT-S;PERICOLACE) 8.6-50 MG per tablet     sucralfate (CARAFATE) 1 GM tablet     amitriptyline (ELAVIL) 10 MG tablet     No current facility-administered medications for this visit.          3/31/2017   Do you avoid NSAIDs such as (Ibuprofen, Aleve, Naproxen, Advil)?   Yes       ROS:     3/31/2017   Vomiting: No   Diarrhea: No   Constipation: Yes   Swallowing trouble: No   Abdominal pain: Yes   Heartburn: No   Rash in skin folds: No   Depression: Yes       PHYSICAL EXAMINATION:  /79  Pulse 54  Temp 98.3  F (36.8  C) (Oral)  Ht 5' 2\"  Wt 177 lb 14.4 oz  SpO2 100%  BMI 32.54 kg/m2       ASSESSMENT AND PLAN:      1. 11 years status laparoscopic gastric bypass  2. Morbid Obesity current BMI: Body mass index is 32.54 kg/(m^2).  3. Post surgical malabsorption:   Labs ordered per protocol.   Follow food plan per dietitian recommendations.   Continue taking recommended post-op vitamins.  4. Return to clinic in 2 months.  5. Increase Vitamin D by 2000 IU  6. B1 injection today in clinic  7. Abd pain improved.  EGD and pH Bravo normal.      Sincerely,    Amrita Adair PA-C    I spent a total of 15 minutes face to face with (patient name dot phrase) during today's office visit. Over 50% of this time was spent counseling the patient and/or coordinating care.    "

## 2017-03-31 NOTE — NURSING NOTE
"(   Chief Complaint   Patient presents with     RECHECK     RBS 1-2 month follow up    )    ( Weight: 177 lb 14.4 oz )  ( Height: 5' 2\" )  ( BMI (Calculated): 32.61 )  ( Initial Weight: 325 lb )  ( Cumulative weight loss (lbs): 147.1 )  (   )  (   )  (   )  (   )    ( BP: 147/79 )  ( Site: Arm, upper left )  ( Temp: 98.3  F (36.8  C) )  ( Temp src: Oral )  ( Pulse: 54 )  (   )  ( SpO2: 100 % )    ( There is no problem list on file for this patient.   )  (   Current Outpatient Prescriptions   Medication Sig Dispense Refill     acetaminophen (TYLENOL) 500 MG tablet Take 500-1,000 mg by mouth       amLODIPine (NORVASC) 10 MG tablet Take 10 mg by mouth       benzonatate (TESSALON) 100 MG capsule Take 100 mg by mouth       calcium carb 1250 mg, 500 mg [place],/vitamin D 200 units (OSCAL WITH D) 500-200 MG-UNIT per tablet        cetirizine HCl 10 MG CAPS        Cholecalciferol (VITAMIN D) 2000 UNITS tablet Take 4,000 Units by mouth       cyanocobalamin (VITAMIN B12) 1000 MCG/ML injection Inject 1,000 mcg into the muscle       esomeprazole (NEXIUM) 40 MG CR capsule Take 40 mg by mouth       fluticasone (FLONASE) 50 MCG/ACT spray 2 sprays       metoprolol (TOPROL-XL) 25 MG 24 hr tablet TAKE 1/2 TABLET BY MOUTH EVERY DAY       omeprazole (PRILOSEC) 20 MG CR capsule Take 20 mg by mouth       senna-docusate (SENOKOT-S;PERICOLACE) 8.6-50 MG per tablet Take 1 tab by mouth twice daily as needed for constipation       sucralfate (CARAFATE) 1 GM tablet Take 1 g by mouth       amitriptyline (ELAVIL) 10 MG tablet Take 10 mg by mouth      )  ( Diabetes Eval:    )    ( Pain Eval:  No Pain (0) )    ( Wound Eval:       )    (   History   Smoking Status     Never Smoker   Smokeless Tobacco     Not on file    )    ( Signed By:  Wily Cuevsa; March 31, 2017; 1:08 PM )    "

## 2017-07-19 ENCOUNTER — CARE COORDINATION (OUTPATIENT)
Dept: SURGERY | Facility: CLINIC | Age: 67
End: 2017-07-19

## 2017-07-19 NOTE — PROGRESS NOTES
2 No Shows  Received: Today       Clarita Andrdae Nisha D, RN; Fidelina Land LPN       Phone Number: [phone]                     Pt called to reschedule her rbs appt with Amrita. Per our call center guidelines, it says that we cannot reschedule pts who have had 2 recent no shows in the clinic and the pt must schedule same day appts through you. If this is still correct, please call pt back to assist. If this protocol needs some updating, please let me know and I'd be happy to help.     Thank you!     [facility]       Called and left message for patient to call back to discuss scheduling clinic appointment.

## 2017-07-27 ENCOUNTER — TRANSFERRED RECORDS (OUTPATIENT)
Dept: HEALTH INFORMATION MANAGEMENT | Facility: CLINIC | Age: 67
End: 2017-07-27

## 2017-09-08 ENCOUNTER — OFFICE VISIT (OUTPATIENT)
Dept: SURGERY | Facility: CLINIC | Age: 67
End: 2017-09-08

## 2017-09-08 VITALS
TEMPERATURE: 98.5 F | BODY MASS INDEX: 34.37 KG/M2 | HEART RATE: 60 BPM | OXYGEN SATURATION: 99 % | WEIGHT: 187.9 LBS | SYSTOLIC BLOOD PRESSURE: 134 MMHG | DIASTOLIC BLOOD PRESSURE: 71 MMHG

## 2017-09-08 DIAGNOSIS — E66.09 NON MORBID OBESITY DUE TO EXCESS CALORIES: ICD-10-CM

## 2017-09-08 DIAGNOSIS — Z98.84 H/O GASTRIC BYPASS: Primary | ICD-10-CM

## 2017-09-08 DIAGNOSIS — Z98.84 H/O GASTRIC BYPASS: ICD-10-CM

## 2017-09-08 LAB
ERYTHROCYTE [DISTWIDTH] IN BLOOD BY AUTOMATED COUNT: 16.9 % (ref 10–15)
FERRITIN SERPL-MCNC: 9 NG/ML (ref 8–252)
HCT VFR BLD AUTO: 38.9 % (ref 35–47)
HGB BLD-MCNC: 11.7 G/DL (ref 11.7–15.7)
MCH RBC QN AUTO: 23.5 PG (ref 26.5–33)
MCHC RBC AUTO-ENTMCNC: 30.1 G/DL (ref 31.5–36.5)
MCV RBC AUTO: 78 FL (ref 78–100)
PLATELET # BLD AUTO: 307 10E9/L (ref 150–450)
RBC # BLD AUTO: 4.97 10E12/L (ref 3.8–5.2)
WBC # BLD AUTO: 5.8 10E9/L (ref 4–11)

## 2017-09-08 RX ORDER — TOPIRAMATE 25 MG/1
TABLET, FILM COATED ORAL
Qty: 90 TABLET | Refills: 1 | Status: SHIPPED | OUTPATIENT
Start: 2017-09-08 | End: 2017-10-26

## 2017-09-08 ASSESSMENT — PAIN SCALES - GENERAL: PAINLEVEL: NO PAIN (0)

## 2017-09-08 NOTE — LETTER
"2017       RE: Pamella Landa  [address] [place] [address]     Dear Colleague,    Thank you for referring your patient, Pamella Landa, to the [facility]NA[facility]EMENT at [facility]. Please see a copy of my visit note below.    Return Bariatric Surgery Note    RE: Pamella Landa  MR#: [id number]  : 1950  VISIT DATE: Sep 8, 2017    Dear Cuca Voss,    I had the pleasure of seeing your patient, Pamella Landa, in my post-bariatric surgery assessment clinic.    CHIEF COMPLAINT: Post-bariatric surgery follow-up    HISTORY OF PRESENT ILLNESS:  Questions Regarding Prior Weight Loss Surgery Reviewed With Patient 2017   I had the following weight loss procedure: Valdemar-en-y Gastric Bypass   What year was your surgery? 207   How has your weight changed since your last visit? I have gained weight   Are you currently taking any weight loss medications? No   Do you currently have any of the following: Sleep Apnea, Hypertension (high blood pressure)?   Have you been to the Emergency room since your last visit with us? No   Were you in the hospital since your last visit with us? Yes   Do you have any concerns today? i am gaiming weight     Last visit note: \"Hx of gastric bypass with Dr Deluca in . She has had epigastric pain that started last year. PCP discontinued her ibuprofen and ASA. She does not smoke. She saw Dr Cochran and she has EGD scheduled soon. Pain is intermittent. Describes it as burning sensation. Pain is worse with some foods but sometimes food helps the pain. Sulcralfate is helping. She is also on PPI\"      Recent EGD and PH Bravo:  [facility]  Impression:          - Normal esophagus.                       - Z-line regular, 36 cm from the incisors.                       - Gastric bypass with a normal-sized pouch and                        disrupted staple line. Gastrojejunal anastomosis                        " characterized by healthy appearing mucosa.                       - The BRAVO pH capsule was deployed.                       - No specimens collected.  Recommendation:      - BRAVO protocol                       return to referring provider as planned.     Her pain has improved significantly.  Still taking omeprazole and carafate. Her appetite is still decreased.    Weight History:     9/8/2017   What is your highest lifetime weight? 32   What is your lowest weight since surgery? (In pounds) 150     Initial Weight: 174 lb 11.2 oz  Current Weight: Weight: 187 lb 14.4 oz  Cumulative weight loss (lbs): -13.2  Last Visits Weight: 177 lb 14.4 oz    Wt Readings from Last 4 Encounters:   09/08/17 187 lb 14.4 oz   03/31/17 177 lb 14.4 oz   03/31/17 177 lb 14.4 oz   02/15/17 173 lb 8 oz       Questions Regarding Co-Morbidities and Health Concerns Reviewed With Patient 9/8/2017   Pre-diabetes: Never   Diabetes II: Never   High Blood Pressure: Improved   High cholesterol: Improved   Heartburn/Reflux: Never   Sleep apnea: Stayed the same   Do you use a CPAP? No   PCOS: Never   Back pain: Improved   Joint pain: Improved   Lower leg swelling: Never       Eating Habits 9/8/2017   How many meals do you eat per day? 2   Do you snack between meals? No   How much food are you eating at each meal? Less than 1/2 cup   Are you able to separate your meals and liquids by at least 30 minutes? Yes   Are you able to avoid liquid calories? Yes       Exercise Questions Reviewed With Patient 9/8/2017   How often do you exercise? 1 to 2 times per week   What is the duration of your exercise (in minutes)? -   What types of exercise do you do? walking   What keeps you from being more active?  I am as active as I can possbily be, My ability to walk or move around is limited       Social History:      9/8/2017   Are you smoking? No   Are you drinking alcohol? No       Medications:  Current Outpatient Prescriptions   Medication     acetaminophen  (TYLENOL) 500 MG tablet     amitriptyline (ELAVIL) 10 MG tablet     amLODIPine (NORVASC) 10 MG tablet     benzonatate (TESSALON) 100 MG capsule     calcium carb 1250 mg, 500 mg [place],/vitamin D 200 units (OSCAL WITH D) 500-200 MG-UNIT per tablet     cetirizine HCl 10 MG CAPS     Cholecalciferol (VITAMIN D) 2000 UNITS tablet     cyanocobalamin (VITAMIN B12) 1000 MCG/ML injection     esomeprazole (NEXIUM) 40 MG CR capsule     fluticasone (FLONASE) 50 MCG/ACT spray     metoprolol (TOPROL-XL) 25 MG 24 hr tablet     omeprazole (PRILOSEC) 20 MG CR capsule     senna-docusate (SENOKOT-S;PERICOLACE) 8.6-50 MG per tablet     sucralfate (CARAFATE) 1 GM tablet     No current facility-administered medications for this visit.          9/8/2017   Do you avoid NSAIDs such as (Ibuprofen, Aleve, Naproxen, Advil)?   Yes       ROS:  GI:      9/8/2017   Vomiting: No   Diarrhea: No   Constipation: No   Swallowing trouble: No   Abdominal pain: Yes   Heartburn: No   Rash in skin folds: No   Depression: No   Stress urinary incontinence No     Skin:   BAR RBS ROS - SKIN 9/8/2017   Rash in skin folds: No     Psych:      9/8/2017   Depression: No   Anxiety: No     Female Only:   BAR RBS ROS - FEMALE ONLY 9/8/2017   Female only: Loss of menstrual cycles       LABS/IMAGING/MEDICAL RECORDS REVIEW:     PHYSICAL EXAMINATION:  /71 (BP Location: Left arm, Patient Position: Chair, Cuff Size: Adult Regular)  Pulse 60  Temp 98.5  F (36.9  C)  Wt 187 lb 14.4 oz  SpO2 99%  BMI 34.37 kg/m2   General: No apparent distress  Neuro: A & O x 3  Head: Atraumatic, normocephalic  Eyes: PERRL, EOMI  Skin: warm and dry, no rashes on exposed skin  Respiratory: respirations unlabored  Abdomen: soft NT ND  Extremities: No LE swelling    ASSESSMENT AND PLAN:      1. 11 years status laparoscopic gastric bypass. She is concerned about weight regain.   2. Morbid Obesity current BMI: Body mass index is 34.37 kg/(m^2).  3. Post surgical malabsorption:   Labs  ordered per protocol.   Follow food plan per dietitian recommendations.   Continue taking recommended post-op vitamins.  4. Return to clinic in 1 year.  5. CBC and ferritin today.  If ferritin not improved from 8 will plan iron infusion  6. Weight regain. Start topiramate ramp to 75mg      MEDICATION STARTED AT THIS APPOINTMENT  We are starting topiramate at bedtime.  Start one tab, 25 mg, for a week. Go up to 50 mg (2 tabs) for the next week. At the third week, take   3 tabs (75 mg).  Stay at 3 tabs until you are seen again. Call the nurse at [phone] if you have any questions or concerns. (Do not stop taking it if you don't think it's working. For some people it works even though they do not feel much different.)    Topiramate (Topamax) is a medication that is used most often to treat migraine headaches or for seizures. It has also been found to help with weight loss. Although it's not currently FDA approved for weight loss, it has been used safely for a number of years to help people who are carrying extra weight.     Just how topiramate helps with weight loss has not been exactly determined. However it seems to work on areas of the brain to quiet down signals related to eating.      Topiramate may make you:    >feel less interest in eating in between meals   >think less about food and eating   >find it easier to push the plate away   >find giving up pop easier    >have an easier time eating less    For some of our patients, the pills work right away. They feel and think quite differently about food. Other patients don't feel much of a change but find in fact they have lost weight! Like all weight loss medications, topiramate works best when you help it work.  This means:    1) Have less tempting high calorie (fattening) food around the house or office    2) Have lower calorie food (fruits, vegetables,low fat meats and dairy) for snacks    3) Eat out only one time or less each week.   4) Eat your meals at a  table with the TV or computer off.    Side-effects. Topiramate is generally well tolerated. The main side-effects we see are:   Tingling in hands,feet, or face (usually not very troublesome)   Mental confusion and word finding trouble (about 10% of patients have this.)     Feeling sleepy or a bit dopey- this goes away very soon after starting.    One of the dangers of topiramate is the possibility of birth defects--if you get pregnant when you are on it, there is the risk that your baby will be born with a cleft lip or palate.  If you are on topiramate and of child bearing age, you need to be on a reliable form of birth control or refrain from sexual intercourse.     Please refer to the pharmacy insert for more information on side-effects. Since many pharmacists are not familiar with the use of topiramate in weight loss, calling the clinic will get you the most accurate information on the use of this medication for weight loss.     In order to get refills of this or any medication we prescribe you must be seen in the medical weight mgmt clinic every 2-3 months. Please have your pharmacy fax a refill request to [phone].      Sincerely,    Amrita Adair PA-C    I spent a total of 20 minutes face to face with Pamella during today's office visit. Over 50% of this time was spent counseling the patient and/or coordinating care.

## 2017-09-08 NOTE — NURSING NOTE
(   Chief Complaint   Patient presents with     RECHECK     RBS    )    ( Weight: 187 lb 14.4 oz )  (   )  (   )  ( Initial Weight: 174 lb 11.2 oz )  ( Cumulative weight loss (lbs): -13.2 )  ( Last Visits Weight: 177 lb 14.4 oz )  ( Wt change since last visit (lbs): 10 )  (   )  (   )    ( BP: 134/71 )  (   )  ( Temp: 98.5  F (36.9  C) )  (   )  ( Pulse: 60 )  (   )  ( SpO2: 99 % )    ( There is no problem list on file for this patient.   )  (   Current Outpatient Prescriptions   Medication Sig Dispense Refill     acetaminophen (TYLENOL) 500 MG tablet Take 500-1,000 mg by mouth       amitriptyline (ELAVIL) 10 MG tablet Take 10 mg by mouth       amLODIPine (NORVASC) 10 MG tablet Take 10 mg by mouth       benzonatate (TESSALON) 100 MG capsule Take 100 mg by mouth       calcium carb 1250 mg, 500 mg [place],/vitamin D 200 units (OSCAL WITH D) 500-200 MG-UNIT per tablet        cetirizine HCl 10 MG CAPS        Cholecalciferol (VITAMIN D) 2000 UNITS tablet Take 4,000 Units by mouth       cyanocobalamin (VITAMIN B12) 1000 MCG/ML injection Inject 1,000 mcg into the muscle       esomeprazole (NEXIUM) 40 MG CR capsule Take 40 mg by mouth       fluticasone (FLONASE) 50 MCG/ACT spray 2 sprays       metoprolol (TOPROL-XL) 25 MG 24 hr tablet TAKE 1/2 TABLET BY MOUTH EVERY DAY       omeprazole (PRILOSEC) 20 MG CR capsule Take 20 mg by mouth       senna-docusate (SENOKOT-S;PERICOLACE) 8.6-50 MG per tablet Take 1 tab by mouth twice daily as needed for constipation       sucralfate (CARAFATE) 1 GM tablet Take 1 g by mouth      )  ( Diabetes Eval:    )    ( Pain Eval:  No Pain (0) )    ( Wound Eval:       )    (   History   Smoking Status     Never Smoker   Smokeless Tobacco     Never Used    )    ( Signed By:  Jasson Ramires; September 8, 2017; 2:46 PM )

## 2017-09-08 NOTE — PATIENT INSTRUCTIONS
See Armita Adair and Dietitian in 1 month  Start topiramate ramp to 75mg    MEDICATION STARTED AT THIS APPOINTMENT  We are starting topiramate at bedtime.  Start one tab, 25 mg, for a week. Go up to 50 mg (2 tabs) for the next week. At the third week, take   3 tabs (75 mg).  Stay at 3 tabs until you are seen again. Call the nurse at [phone] if you have any questions or concerns. (Do not stop taking it if you don't think it's working. For some people it works even though they do not feel much different.)    Topiramate (Topamax) is a medication that is used most often to treat migraine headaches or for seizures. It has also been found to help with weight loss. Although it's not currently FDA approved for weight loss, it has been used safely for a number of years to help people who are carrying extra weight.     Just how topiramate helps with weight loss has not been exactly determined. However it seems to work on areas of the brain to quiet down signals related to eating.      Topiramate may make you:    >feel less interest in eating in between meals   >think less about food and eating   >find it easier to push the plate away   >find giving up pop easier    >have an easier time eating less    For some of our patients, the pills work right away. They feel and think quite differently about food. Other patients don't feel much of a change but find in fact they have lost weight! Like all weight loss medications, topiramate works best when you help it work.  This means:    1) Have less tempting high calorie (fattening) food around the house or office    2) Have lower calorie food (fruits, vegetables,low fat meats and dairy) for snacks    3) Eat out only one time or less each week.   4) Eat your meals at a table with the TV or computer off.    Side-effects. Topiramate is generally well tolerated. The main side-effects we see are:   Tingling in hands,feet, or face (usually not very troublesome)   Mental confusion and  word finding trouble (about 10% of patients have this.)     Feeling sleepy or a bit dopey- this goes away very soon after starting.    One of the dangers of topiramate is the possibility of birth defects--if you get pregnant when you are on it, there is the risk that your baby will be born with a cleft lip or palate.  If you are on topiramate and of child bearing age, you need to be on a reliable form of birth control or refrain from sexual intercourse.     Please refer to the pharmacy insert for more information on side-effects. Since many pharmacists are not familiar with the use of topiramate in weight loss, calling the clinic will get you the most accurate information on the use of this medication for weight loss.     In order to get refills of this or any medication we prescribe you must be seen in the medical weight mgmt clinic every 2-3 months. Please have your pharmacy fax a refill request to [phone].

## 2017-09-08 NOTE — PROGRESS NOTES
"Return Bariatric Surgery Note    RE: Pamella Landa  MR#: [id number]  : 1950  VISIT DATE: Sep 8, 2017    Dear Cuca oVss,    I had the pleasure of seeing your patient, Pamella Landa, in my post-bariatric surgery assessment clinic.    CHIEF COMPLAINT: Post-bariatric surgery follow-up    HISTORY OF PRESENT ILLNESS:  Questions Regarding Prior Weight Loss Surgery Reviewed With Patient 2017   I had the following weight loss procedure: Valdemar-en-y Gastric Bypass   What year was your surgery? 207   How has your weight changed since your last visit? I have gained weight   Are you currently taking any weight loss medications? No   Do you currently have any of the following: Sleep Apnea, Hypertension (high blood pressure)?   Have you been to the Emergency room since your last visit with us? No   Were you in the hospital since your last visit with us? Yes   Do you have any concerns today? i am gaiming weight     Last visit note: \"Hx of gastric bypass with Dr Deluca in . She has had epigastric pain that started last year. PCP discontinued her ibuprofen and ASA. She does not smoke. She saw Dr Cochran and she has EGD scheduled soon. Pain is intermittent. Describes it as burning sensation. Pain is worse with some foods but sometimes food helps the pain. Sulcralfate is helping. She is also on PPI\"      Recent EGD and PH Bravo:  [facility]  Impression:          - Normal esophagus.                       - Z-line regular, 36 cm from the incisors.                       - Gastric bypass with a normal-sized pouch and                        disrupted staple line. Gastrojejunal anastomosis                        characterized by healthy appearing mucosa.                       - The BRAVO pH capsule was deployed.                       - No specimens collected.  Recommendation:      - BRAVO protocol                       return to referring provider as planned.     Her pain has improved significantly.  Still " taking omeprazole and carafate. Her appetite is still decreased.    Weight History:     9/8/2017   What is your highest lifetime weight? 32   What is your lowest weight since surgery? (In pounds) 150     Initial Weight: 174 lb 11.2 oz  Current Weight: Weight: 187 lb 14.4 oz  Cumulative weight loss (lbs): -13.2  Last Visits Weight: 177 lb 14.4 oz    Wt Readings from Last 4 Encounters:   09/08/17 187 lb 14.4 oz   03/31/17 177 lb 14.4 oz   03/31/17 177 lb 14.4 oz   02/15/17 173 lb 8 oz       Questions Regarding Co-Morbidities and Health Concerns Reviewed With Patient 9/8/2017   Pre-diabetes: Never   Diabetes II: Never   High Blood Pressure: Improved   High cholesterol: Improved   Heartburn/Reflux: Never   Sleep apnea: Stayed the same   Do you use a CPAP? No   PCOS: Never   Back pain: Improved   Joint pain: Improved   Lower leg swelling: Never       Eating Habits 9/8/2017   How many meals do you eat per day? 2   Do you snack between meals? No   How much food are you eating at each meal? Less than 1/2 cup   Are you able to separate your meals and liquids by at least 30 minutes? Yes   Are you able to avoid liquid calories? Yes       Exercise Questions Reviewed With Patient 9/8/2017   How often do you exercise? 1 to 2 times per week   What is the duration of your exercise (in minutes)? -   What types of exercise do you do? walking   What keeps you from being more active?  I am as active as I can possbily be, My ability to walk or move around is limited       Social History:      9/8/2017   Are you smoking? No   Are you drinking alcohol? No       Medications:  Current Outpatient Prescriptions   Medication     acetaminophen (TYLENOL) 500 MG tablet     amitriptyline (ELAVIL) 10 MG tablet     amLODIPine (NORVASC) 10 MG tablet     benzonatate (TESSALON) 100 MG capsule     calcium carb 1250 mg, 500 mg [place],/vitamin D 200 units (OSCAL WITH D) 500-200 MG-UNIT per tablet     cetirizine HCl 10 MG CAPS     Cholecalciferol (VITAMIN D)  2000 UNITS tablet     cyanocobalamin (VITAMIN B12) 1000 MCG/ML injection     esomeprazole (NEXIUM) 40 MG CR capsule     fluticasone (FLONASE) 50 MCG/ACT spray     metoprolol (TOPROL-XL) 25 MG 24 hr tablet     omeprazole (PRILOSEC) 20 MG CR capsule     senna-docusate (SENOKOT-S;PERICOLACE) 8.6-50 MG per tablet     sucralfate (CARAFATE) 1 GM tablet     No current facility-administered medications for this visit.          9/8/2017   Do you avoid NSAIDs such as (Ibuprofen, Aleve, Naproxen, Advil)?   Yes       ROS:  GI:      9/8/2017   Vomiting: No   Diarrhea: No   Constipation: No   Swallowing trouble: No   Abdominal pain: Yes   Heartburn: No   Rash in skin folds: No   Depression: No   Stress urinary incontinence No     Skin:   BAR RBS ROS - SKIN 9/8/2017   Rash in skin folds: No     Psych:      9/8/2017   Depression: No   Anxiety: No     Female Only:   BAR RBS ROS - FEMALE ONLY 9/8/2017   Female only: Loss of menstrual cycles       LABS/IMAGING/MEDICAL RECORDS REVIEW:     PHYSICAL EXAMINATION:  /71 (BP Location: Left arm, Patient Position: Chair, Cuff Size: Adult Regular)  Pulse 60  Temp 98.5  F (36.9  C)  Wt 187 lb 14.4 oz  SpO2 99%  BMI 34.37 kg/m2   General: No apparent distress  Neuro: A & O x 3  Head: Atraumatic, normocephalic  Eyes: PERRL, EOMI  Skin: warm and dry, no rashes on exposed skin  Respiratory: respirations unlabored  Abdomen: soft NT ND  Extremities: No LE swelling    ASSESSMENT AND PLAN:      1. 11 years status laparoscopic gastric bypass. She is concerned about weight regain.   2. Morbid Obesity current BMI: Body mass index is 34.37 kg/(m^2).  3. Post surgical malabsorption:   Labs ordered per protocol.   Follow food plan per dietitian recommendations.   Continue taking recommended post-op vitamins.  4. Return to clinic in 1 year.  5. CBC and ferritin today.  If ferritin not improved from 8 will plan iron infusion  6. Weight regain. Start topiramate ramp to 75mg      MEDICATION STARTED AT  THIS APPOINTMENT  We are starting topiramate at bedtime.  Start one tab, 25 mg, for a week. Go up to 50 mg (2 tabs) for the next week. At the third week, take   3 tabs (75 mg).  Stay at 3 tabs until you are seen again. Call the nurse at [phone] if you have any questions or concerns. (Do not stop taking it if you don't think it's working. For some people it works even though they do not feel much different.)    Topiramate (Topamax) is a medication that is used most often to treat migraine headaches or for seizures. It has also been found to help with weight loss. Although it's not currently FDA approved for weight loss, it has been used safely for a number of years to help people who are carrying extra weight.     Just how topiramate helps with weight loss has not been exactly determined. However it seems to work on areas of the brain to quiet down signals related to eating.      Topiramate may make you:    >feel less interest in eating in between meals   >think less about food and eating   >find it easier to push the plate away   >find giving up pop easier    >have an easier time eating less    For some of our patients, the pills work right away. They feel and think quite differently about food. Other patients don't feel much of a change but find in fact they have lost weight! Like all weight loss medications, topiramate works best when you help it work.  This means:    1) Have less tempting high calorie (fattening) food around the house or office    2) Have lower calorie food (fruits, vegetables,low fat meats and dairy) for snacks    3) Eat out only one time or less each week.   4) Eat your meals at a table with the TV or computer off.    Side-effects. Topiramate is generally well tolerated. The main side-effects we see are:   Tingling in hands,feet, or face (usually not very troublesome)   Mental confusion and word finding trouble (about 10% of patients have this.)     Feeling sleepy or a bit dopey- this goes  away very soon after starting.    One of the dangers of topiramate is the possibility of birth defects--if you get pregnant when you are on it, there is the risk that your baby will be born with a cleft lip or palate.  If you are on topiramate and of child bearing age, you need to be on a reliable form of birth control or refrain from sexual intercourse.     Please refer to the pharmacy insert for more information on side-effects. Since many pharmacists are not familiar with the use of topiramate in weight loss, calling the clinic will get you the most accurate information on the use of this medication for weight loss.     In order to get refills of this or any medication we prescribe you must be seen in the medical weight mgmt clinic every 2-3 months. Please have your pharmacy fax a refill request to [phone].      Sincerely,    Amrita Adair PA-C    I spent a total of 20 minutes face to face with Pamella during today's office visit. Over 50% of this time was spent counseling the patient and/or coordinating care.

## 2017-09-08 NOTE — MR AVS SNAPSHOT
After Visit Summary   9/8/2017    Pamella Landa    MRN: [id number]           Patient Information     Date Of Birth          1950        Visit Information        Provider Department      9/8/2017 2:45 PM Amrita Adair PA-C [facility] Surgical Weight Management        Today's Diagnoses     H/O gastric bypass    -  1    Non morbid obesity due to excess calories          Care Instructions    See Amrita Adair and Dietitian in 1 month  Start topiramate ramp to 75mg    MEDICATION STARTED AT THIS APPOINTMENT  We are starting topiramate at bedtime.  Start one tab, 25 mg, for a week. Go up to 50 mg (2 tabs) for the next week. At the third week, take   3 tabs (75 mg).  Stay at 3 tabs until you are seen again. Call the nurse at [phone] if you have any questions or concerns. (Do not stop taking it if you don't think it's working. For some people it works even though they do not feel much different.)    Topiramate (Topamax) is a medication that is used most often to treat migraine headaches or for seizures. It has also been found to help with weight loss. Although it's not currently FDA approved for weight loss, it has been used safely for a number of years to help people who are carrying extra weight.     Just how topiramate helps with weight loss has not been exactly determined. However it seems to work on areas of the brain to quiet down signals related to eating.      Topiramate may make you:    >feel less interest in eating in between meals   >think less about food and eating   >find it easier to push the plate away   >find giving up pop easier    >have an easier time eating less    For some of our patients, the pills work right away. They feel and think quite differently about food. Other patients don't feel much of a change but find in fact they have lost weight! Like all weight loss medications, topiramate works best when you help it work.  This means:    1) Have less tempting high  calorie (fattening) food around the house or office    2) Have lower calorie food (fruits, vegetables,low fat meats and dairy) for snacks    3) Eat out only one time or less each week.   4) Eat your meals at a table with the TV or computer off.    Side-effects. Topiramate is generally well tolerated. The main side-effects we see are:   Tingling in hands,feet, or face (usually not very troublesome)   Mental confusion and word finding trouble (about 10% of patients have this.)     Feeling sleepy or a bit dopey- this goes away very soon after starting.    One of the dangers of topiramate is the possibility of birth defects--if you get pregnant when you are on it, there is the risk that your baby will be born with a cleft lip or palate.  If you are on topiramate and of child bearing age, you need to be on a reliable form of birth control or refrain from sexual intercourse.     Please refer to the pharmacy insert for more information on side-effects. Since many pharmacists are not familiar with the use of topiramate in weight loss, calling the clinic will get you the most accurate information on the use of this medication for weight loss.     In order to get refills of this or any medication we prescribe you must be seen in the medical weight mgmt clinic every 2-3 months. Please have your pharmacy fax a refill request to [phone].                  Follow-ups after your visit        Follow-up notes from your care team     Return in 4 weeks (on 10/6/2017).      Future tests that were ordered for you today     Open Future Orders        Priority Expected Expires Ordered    Ferritin Routine 9/8/2017 10/8/2017 9/8/2017    CBC with platelets Routine 9/8/2017 10/8/2017 9/8/2017            Who to contact     Please call your clinic at [phone] to:    Ask questions about your health    Make or cancel appointments    Discuss your medicines    Learn about your test results    Speak to your doctor   If you have compliments or  concerns about an experience at your clinic, or if you wish to file a complaint, please contact [facility] Patient Relations at [phone] or email us at [email or web address]         Additional Information About Your Visit        Sidewalk Information     Sidewalk is an electronic gateway that provides easy, online access to your medical records. With Sidewalk, you can request a clinic appointment, read your test results, renew a prescription or communicate with your care team.     To sign up for Sidewalk visit the website at [email or web address]   You will be asked to enter the access code listed below, as well as some personal information. Please follow the directions to create your username and password.     Your access code is: [email or web address]  Expires: 2017  6:30 AM     Your access code will  in 90 days. If you need help or a new code, please contact your [facility] Clinic or call [phone] for assistance.        Care EveryWhere ID     This is your Care EveryWhere ID. This could be used by other organizations to access your [facility] medical records  [id number]        Your Vitals Were     Pulse Temperature Pulse Oximetry BMI (Body Mass Index)          60 98.5  F (36.9  C) 99% 34.37 kg/m2         Blood Pressure from Last 3 Encounters:   17 134/71   17 147/79   02/15/17 139/84    Weight from Last 3 Encounters:   17 187 lb 14.4 oz   17 177 lb 14.4 oz   17 177 lb 14.4 oz                 Today's Medication Changes          These changes are accurate as of: 17  3:02 PM.  If you have any questions, ask your nurse or doctor.               Start taking these medicines.        Dose/Directions    topiramate 25 MG tablet   Commonly known as:  TOPAMAX   Used for:  Non morbid obesity due to excess calories   Started by:  Amrita Adair PA-C        25mg at bedtime for week 1, 50mg at bedtime for 1 week,  and 75mg at bedtime thereafter   Quantity:  90 tablet   Refills:  1            Where to get your medicines      These medications were sent to [id number] - [place], [place] - [address]  [address], [place] [address]     Phone:  [phone]     topiramate 25 MG tablet                Primary Care Provider Office Phone # Fax #    Cuca Voss [phone]       [facility] [address] [address]        Equal Access to Services     [facility]: Hadii aad ku hadasho Soomaali, waaxda luqadaha, qaybta kaalmada adeegyada, waxay tamarain hayaan jimena martinez . So [facility] [phone].    ATENCIÓN: Si habla español, tiene a burgos disposición servicios gratuitos de asistencia lingüística. [facility] [phone].    We comply with applicable federal civil rights laws and [place] laws. We do not discriminate on the basis of race, color, national origin, age, disability sex, sexual orientation or gender identity.            Thank you!     Thank you for choosing [facility]GICAL [facility]NAGEMENT  for your care. Our goal is always to provide you with excellent care. Hearing back from our patients is one way we can continue to improve our services. Please take a few minutes to complete the written survey that you may receive in the mail after your visit with us. Thank you!             Your Updated Medication List - Protect others around you: Learn how to safely use, store and throw away your medicines at www.disposemymeds.org.          This list is accurate as of: 9/8/17  3:02 PM.  Always use your most recent med list.                   Brand Name Dispense Instructions for use Diagnosis    acetaminophen 500 MG tablet    TYLENOL     Take 500-1,000 mg by mouth        amitriptyline 10 MG tablet    ELAVIL     Take 10 mg by mouth        amLODIPine 10 MG tablet    NORVASC     Take 10 mg by mouth        benzonatate 100 MG  capsule    TESSALON     Take 100 mg by mouth        calcium carb 1250 mg (500 mg [place])/vitamin D 200 units 500-200 MG-UNIT per tablet    OSCAL with D          cetirizine HCl 10 MG Caps           cyanocobalamin 1000 MCG/ML injection    VITAMIN B12     Inject 1,000 mcg into the muscle        esomeprazole 40 MG CR capsule    nexIUM     Take 40 mg by mouth        fluticasone 50 MCG/ACT spray    FLONASE     2 sprays        metoprolol 25 MG 24 hr tablet    TOPROL-XL     TAKE 1/2 TABLET BY MOUTH EVERY DAY        omeprazole 20 MG CR capsule    priLOSEC     Take 20 mg by mouth        senna-docusate 8.6-50 MG per tablet    SENOKOT-S;PERICOLACE     Take 1 tab by mouth twice daily as needed for constipation        sucralfate 1 GM tablet    CARAFATE     Take 1 g by mouth        topiramate 25 MG tablet    TOPAMAX    90 tablet    25mg at bedtime for week 1, 50mg at bedtime for 1 week, and 75mg at bedtime thereafter    Non morbid obesity due to excess calories       vitamin D 2000 UNITS tablet      Take 4,000 Units by mouth

## 2017-09-10 LAB — VIT B1 SERPL-MCNC: <2 NMOL/L (ref 4–15)

## 2017-09-11 ENCOUNTER — CARE COORDINATION (OUTPATIENT)
Dept: LAB | Facility: CLINIC | Age: 67
End: 2017-09-11

## 2017-09-11 DIAGNOSIS — E51.9 THIAMIN DEFICIENCY: Primary | ICD-10-CM

## 2017-09-11 NOTE — PROGRESS NOTES
Called and spoke to patient. She needs to have her B1 rechecked. Plasma B1 was drawn and that is not a good measurement of her body stores for B1.    Lab appt scheduled for Wednesday.

## 2017-09-12 ENCOUNTER — CARE COORDINATION (OUTPATIENT)
Dept: SURGERY | Facility: CLINIC | Age: 67
End: 2017-09-12

## 2017-09-12 PROBLEM — Z98.84 BARIATRIC SURGERY STATUS: Status: ACTIVE | Noted: 2017-09-12

## 2017-09-12 PROBLEM — K90.9 MALABSORPTION: Status: ACTIVE | Noted: 2017-09-12

## 2017-09-12 RX ORDER — DIPHENHYDRAMINE HCL 50 MG
50 CAPSULE ORAL EVERY 6 HOURS PRN
Status: CANCELLED
Start: 2017-09-12

## 2017-09-12 RX ORDER — ACETAMINOPHEN 325 MG/1
650 TABLET ORAL EVERY 4 HOURS PRN
Status: CANCELLED
Start: 2017-09-12

## 2017-09-13 ENCOUNTER — HOSPITAL ENCOUNTER (OUTPATIENT)
Facility: CLINIC | Age: 67
Setting detail: SPECIMEN
Discharge: HOME | End: 2017-09-13
Admitting: PHYSICIAN ASSISTANT
Payer: COMMERCIAL

## 2017-09-13 DIAGNOSIS — E51.9 THIAMIN DEFICIENCY: ICD-10-CM

## 2017-09-13 PROCEDURE — 36415 COLL VENOUS BLD VENIPUNCTURE: CPT | Performed by: PHYSICIAN ASSISTANT

## 2017-09-13 PROCEDURE — 84425 ASSAY OF VITAMIN B-1: CPT | Performed by: PHYSICIAN ASSISTANT

## 2017-09-15 ENCOUNTER — CARE COORDINATION (OUTPATIENT)
Dept: SURGERY | Facility: CLINIC | Age: 67
End: 2017-09-15

## 2017-09-15 DIAGNOSIS — Z98.84 HISTORY OF ROUX-EN-Y GASTRIC BYPASS: ICD-10-CM

## 2017-09-15 DIAGNOSIS — E61.1 IRON DEFICIENCY: Primary | ICD-10-CM

## 2017-09-15 NOTE — PROGRESS NOTES
Spoke with RD. Will try Vitron C (if covered) TID. Recheck her labs in 2 months.     Notified patient. She verbalized understanding.

## 2017-09-15 NOTE — PROGRESS NOTES
Patient labs indicate she needs iron infusion. Her insurance will not cover per Financial department. She would need to sign a waiver that states she will pay out of pocket if her insurance does not  any of it.     Other option is to increase her oral iron supplement. Patient would like to do this. Rx will be sent to her pharmacy.     Patient would also like to know out of pocket cost. Message sent to .

## 2017-09-16 LAB — VIT B1 BLD-MCNC: 85 NMOL/L (ref 70–180)

## 2017-09-19 ENCOUNTER — CARE COORDINATION (OUTPATIENT)
Dept: SURGERY | Facility: CLINIC | Age: 67
End: 2017-09-19

## 2017-09-19 NOTE — PROGRESS NOTES
"Called and notified patient that she should go ahead and order her iron infusion. Please see message below.     RE: iron infusion diagnosis   Received: Yesterday       Phoenix, Ethan Simmer, Nisha D, STORM                   Hi,     Absolutely! We have heard everything we need to from the patient's insurance company.     Thanks!     Ethan Phoenix    - Infusion Therapy   [facility]   [email or web address]  [email or web address]            Previous Messages       ----- Message -----      From: Pamela Will RN      Sent: 9/18/2017   1:13 PM        To: Amrita Adair PA-C, Ethan Phoenix   Subject: RE: iron infusion diagnosis                       Yes please. She is concerned about what we told her vs her insurance.     So, can I have the patient schedule the infusion?   ----- Message -----      From: Phoenix, Ethan      Sent: 9/18/2017  11:31 AM        To: Amrita Adair PA-C, *   Subject: RE: iron infusion diagnosis                       Viraj Santiago,     I spoke to [employer] insurance company this morning. Per the representative, they normally require that all medications follow FDA guidelines/usage or receive prior authorization. However there is a specific \"IV Iron Therapy\" policy which will cover Infed regardless of the diagnosis, without prior authorization.     Would you like me to call the patient to inform?     Thanks!     Ethan Phoenix    - Infusion Therapy   [facility] Center   [email or web address]  [email or web address]                  "

## 2017-10-26 ENCOUNTER — OFFICE VISIT (OUTPATIENT)
Dept: SURGERY | Facility: CLINIC | Age: 67
End: 2017-10-26

## 2017-10-26 ENCOUNTER — ALLIED HEALTH/NURSE VISIT (OUTPATIENT)
Dept: GASTROENTEROLOGY | Facility: CLINIC | Age: 67
End: 2017-10-26

## 2017-10-26 VITALS
HEIGHT: 62 IN | OXYGEN SATURATION: 99 % | DIASTOLIC BLOOD PRESSURE: 71 MMHG | HEART RATE: 62 BPM | SYSTOLIC BLOOD PRESSURE: 127 MMHG | BODY MASS INDEX: 33.88 KG/M2 | WEIGHT: 184.1 LBS

## 2017-10-26 DIAGNOSIS — E66.09 NON MORBID OBESITY DUE TO EXCESS CALORIES: ICD-10-CM

## 2017-10-26 DIAGNOSIS — D50.9 IRON DEFICIENCY ANEMIA, UNSPECIFIED IRON DEFICIENCY ANEMIA TYPE: Primary | ICD-10-CM

## 2017-10-26 DIAGNOSIS — E66.9 OBESITY: ICD-10-CM

## 2017-10-26 DIAGNOSIS — Z71.3 NUTRITIONAL COUNSELING: Primary | ICD-10-CM

## 2017-10-26 DIAGNOSIS — D50.9 IRON DEFICIENCY ANEMIA, UNSPECIFIED IRON DEFICIENCY ANEMIA TYPE: ICD-10-CM

## 2017-10-26 LAB
ERYTHROCYTE [DISTWIDTH] IN BLOOD BY AUTOMATED COUNT: 17.4 % (ref 10–15)
FERRITIN SERPL-MCNC: 11 NG/ML (ref 8–252)
HCT VFR BLD AUTO: 41 % (ref 35–47)
HGB BLD-MCNC: 12.3 G/DL (ref 11.7–15.7)
MCH RBC QN AUTO: 24.1 PG (ref 26.5–33)
MCHC RBC AUTO-ENTMCNC: 30 G/DL (ref 31.5–36.5)
MCV RBC AUTO: 80 FL (ref 78–100)
PLATELET # BLD AUTO: 256 10E9/L (ref 150–450)
RBC # BLD AUTO: 5.11 10E12/L (ref 3.8–5.2)
WBC # BLD AUTO: 4.6 10E9/L (ref 4–11)

## 2017-10-26 RX ORDER — TOPIRAMATE 25 MG/1
TABLET, FILM COATED ORAL
Qty: 90 TABLET | Refills: 3 | Status: SHIPPED | OUTPATIENT
Start: 2017-10-26 | End: 2018-05-11

## 2017-10-26 ASSESSMENT — PAIN SCALES - GENERAL: PAINLEVEL: NO PAIN (0)

## 2017-10-26 NOTE — PATIENT INSTRUCTIONS
Return to clinic in 2-3 months with Amrita Adair RBS visit    Restart topiramate ramp to 75mg    Labs today to determine if iron infusion needed.

## 2017-10-26 NOTE — LETTER
"10/26/2017       RE: Pamella Landa  [address] [place] [address]     Dear Colleague,    Thank you for referring your patient, Pamella Landa, to the [facility] SURGICAL WEIGHT MANAGEMENT at [facility]. Please see a copy of my visit note below.    Return Bariatric Surgery Note    RE: Pamella Landa  MR#: [id number]  : 1950  VISIT DATE: Oct 26, 2017    Dear Cuca Voss,    I had the pleasure of seeing your patient, Pamella Landa, in my post-bariatric surgery assessment clinic.    CHIEF COMPLAINT: Post-bariatric surgery follow-up    HISTORY OF PRESENT ILLNESS:  Questions Regarding Prior Weight Loss Surgery Reviewed With Patient 10/26/2017   I had the following weight loss procedure: Valdemar-en-y Gastric Bypass   What year was your surgery? 2000   How has your weight changed since your last visit? I have gained weight   Are you currently taking any weight loss medications? No   Do you currently have any of the following: Sleep Apnea   Have you been to the Emergency room since your last visit with us? Yes   Were you in the hospital since your last visit with us? No   Do you have any concerns today? yes becaue i am gaineng wihgt     \"Hx of gastric bypass with Dr Deluca in . She has had epigastric pain that started last year. PCP discontinued her ibuprofen and ASA. She does not smoke. She saw Dr Cochran and she has EGD scheduled soon. Pain is intermittent. Describes it as burning sensation. Pain is worse with some foods but sometimes food helps the pain. Sulcralfate is helping. She is also on PPI\"    Last visit I started topiramate but didn't take for very long because she didn't feel it was working.    Weight History:     10/26/2017   What is your highest lifetime weight? 325   What is your lowest weight since surgery? (In pounds) 150     Initial Weight: 147.4 kg (325 lb)  Current Weight: Weight: 83.5 kg (184 lb 1.6 oz)  Cumulative weight loss " (lbs): 140.9  Last Visits Weight: 79.2 kg (174 lb 11.2 oz)    Questions Regarding Co-Morbidities and Health Concerns Reviewed With Patient 10/26/2017   Pre-diabetes: Never   Diabetes II: Never   High Blood Pressure: Improved   High cholesterol: Improved   Heartburn/Reflux: Improved   Sleep apnea: Stayed the same   Do you use a CPAP? No   PCOS: Improved   Back pain: Improved   Joint pain: Improved   Lower leg swelling: Stayed the same       Eating Habits 10/26/2017   How many meals do you eat per day? 2   Do you snack between meals? No   How much food are you eating at each meal? 1/2 cup to 1 cup   Are you able to separate your meals and liquids by at least 30 minutes? Sometimes   Are you able to avoid liquid calories? Yes       Exercise Questions Reviewed With Patient 10/26/2017   How often do you exercise? 1 to 2 times per week   What is the duration of your exercise (in minutes)? -   What types of exercise do you do? -   What keeps you from being more active?  I should be more active but I just have not gotten around to it, Shortness of breath       Social History:      10/26/2017   Are you smoking? No   Are you drinking alcohol? No       Medications:  Current Outpatient Prescriptions   Medication     ferrous fumarate 65 mg, [place]. FE,-Vitamin C 125 mg (VITRON C)  MG TABS tablet     topiramate (TOPAMAX) 25 MG tablet     acetaminophen (TYLENOL) 500 MG tablet     amLODIPine (NORVASC) 10 MG tablet     benzonatate (TESSALON) 100 MG capsule     calcium carb 1250 mg, 500 mg [place],/vitamin D 200 units (OSCAL WITH D) 500-200 MG-UNIT per tablet     cetirizine HCl 10 MG CAPS     Cholecalciferol (VITAMIN D) 2000 UNITS tablet     cyanocobalamin (VITAMIN B12) 1000 MCG/ML injection     esomeprazole (NEXIUM) 40 MG CR capsule     fluticasone (FLONASE) 50 MCG/ACT spray     metoprolol (TOPROL-XL) 25 MG 24 hr tablet     omeprazole (PRILOSEC) 20 MG CR capsule     senna-docusate (SENOKOT-S;PERICOLACE) 8.6-50 MG per tablet      "sucralfate (CARAFATE) 1 GM tablet     amitriptyline (ELAVIL) 10 MG tablet     No current facility-administered medications for this visit.          10/26/2017   Do you avoid NSAIDs such as (Ibuprofen, Aleve, Naproxen, Advil)?   Yes       ROS:  GI:      10/26/2017   Vomiting: No   Diarrhea: No   Constipation: Yes   Swallowing trouble: No   Abdominal pain: Yes   Heartburn: No   Rash in skin folds: No   Depression: Yes   Stress urinary incontinence Yes     Skin:   BAR RBS ROS - SKIN 10/26/2017   Rash in skin folds: No     Psych:      10/26/2017   Depression: Yes   Anxiety: Yes     Female Only:   SARA RBS ROS - FEMALE ONLY 10/26/2017   Female only: None of the above       LABS/IMAGING/MEDICAL RECORDS REVIEW:     PHYSICAL EXAMINATION:  /71 (BP Location: Left arm, Patient Position: Chair, Cuff Size: Adult Regular)  Pulse 62  Ht 1.575 m (5' 2\")  Wt 83.5 kg (184 lb 1.6 oz)  SpO2 99%  BMI 33.67 kg/m2   General: No apparent distress  Neuro: A & O x 3  Head: Atraumatic, normocephalic  Eyes: PERRL, EOMI  Skin: warm and dry, no rashes on exposed skin  Respiratory: respirations unlabored  Abdomen: soft NT ND   Extremities: No LE swelling      ASSESSMENT AND PLAN:      1. 11 years status post laparoscopic gastric bypass.  Iron deficiency anemia. She is taking oral iron to see if this helps before having iron infusion. GERD she previously had is improved.  2. Morbid Obesity current BMI: Body mass index is 33.67 kg/(m^2).  3. Post surgical malabsorption:   Labs ordered per protocol.   Follow food plan per dietitian recommendations.   Continue taking recommended post-op vitamins.  4. Return to clinic in 2-3 months with Amrita Adair  5. Restart topiramate ramp to 75mg  6. Labs today to determine if iron infusion needed.  7. GERD much improved on omeprazole and carafate.         Sincerely,    Amrita Adair PA-C    I spent a total of 15 minutes face to face with Pamella during today's office visit. Over 50% of this " time was spent counseling the patient and/or coordinating care.    Again, thank you for allowing me to participate in the care of your patient.      Sincerely,    Amrita Adair PA-C

## 2017-10-26 NOTE — MR AVS SNAPSHOT
After Visit Summary   10/26/2017    Pamella Landa    MRN: [id number]           Patient Information     Date Of Birth          1950        Visit Information        Provider Department      10/26/2017 3:00 PM Cristina Reed RD [facility]        Care Instructions    It was a pleasure meeting you today:    1. Eat 3 small balanced meals per day with lean protein at all meals. Eat protein foods first then vegetables, fruit and if room whole grain starches/healthy starches. (i.e of protein serving size ~1/2 c cottage cheese, 6 oz greek yogurt, 2-3 oz of lean meat/fish).  2. Try a protein shake meal replacement if not hungry for a meal.  Try Muscle Milk (lactose-free).  You may try other lactose-free ones as long as they meet the following criteria: 200 or less calories, no more than 10 grams of sugar, and at least 20 grams of protein.   3. Increase water to drink at least 3-4 bottles (6-8 cups per day).            Follow-ups after your visit        Your next 10 appointments already scheduled     Oct 26, 2017  3:00 PM CDT   NUTRITION VISIT with SHEILA [facility] ([facility] [place])    [address] [place] [id number] [phone]            Oct 27, 2017  2:00 PM CDT   (Arrive by 1:45 PM)   NUTRITION VISIT with SHEILA Borja [facility] ([facility] [place])    [address] [place] [id number] [phone]              Future tests that were ordered for you today     Open Future Orders        Priority Expected Expires Ordered    Ferritin Routine  10/27/2018 10/26/2017            Who to contact     Please call your clinic at [phone] to:    Ask questions about your health    Make or cancel appointments    Discuss your medicines    Learn about your test results    Speak to your doctor   If you have  compliments or concerns about an experience at your clinic, or if you wish to file a complaint, please contact [facility] Patient Relations at [phone] or email us at [email or web address]         Additional Information About Your Visit        Move[employer] Information     [employer] is an electronic gateway that provides easy, online access to your medical records. With [employer], you can request a clinic appointment, read your test results, renew a prescription or communicate with your care team.     To sign up for [employer] visit the website at [email or web address]   You will be asked to enter the access code listed below, as well as some personal information. Please follow the directions to create your username and password.     Your access code is: [email or web address]  Expires: 2017  6:30 AM     Your access code will  in 90 days. If you need help or a new code, please contact your [facility] Clinic or call [phone] for assistance.        Care EveryWhere ID     This is your Care EveryWhere ID. This could be used by other organizations to access your [facility] medical records  [id number]         Blood Pressure from Last 3 Encounters:   10/26/17 127/71   17 134/71   17 147/79    Weight from Last 3 Encounters:   10/26/17 83.5 kg (184 lb 1.6 oz)   17 85.2 kg (187 lb 14.4 oz)   17 80.7 kg (177 lb 14.4 oz)              Today, you had the following     No orders found for display         Where to get your medicines      These medications were sent to [id number] - [place], [place] - [address] AT [facility] [address]  [address], [place] [address]     Phone:  [phone]     topiramate 25 MG tablet          Primary Care Provider Office Phone # Fax #    Cuca Voss [phone]       [facility] [place] [address] [address] [facility] [address]        Equal Access  to Services     LEAH GIFFORD : Carson Parks, wanaz mckenna, qabaileymarcy valdezbongsivan henry. So [facility] [place] [facility] [phone].    ATENCIÓN: Si nirmal jane, tiene a burgos disposición servicios gratuitos de asistencia lingüística. Llame al [phone].    We comply with applicable federal civil rights laws and [place] laws. We do not discriminate on the basis of race, color, national origin, age, disability, sex, sexual orientation, or gender identity.            Thank you!     Thank you for choosing [facility]  for your care. Our goal is always to provide you with excellent care. Hearing back from our patients is one way we can continue to improve our services. Please take a few minutes to complete the written survey that you may receive in the mail after your visit with us. Thank you!             Your Updated Medication List - Protect others around you: Learn how to safely use, store and throw away your medicines at www.disposemymeds.org.          This list is accurate as of: 10/26/17  2:36 PM.  Always use your most recent med list.                   Brand Name Dispense Instructions for use Diagnosis    acetaminophen 500 MG tablet    TYLENOL     Take 500-1,000 mg by mouth        amitriptyline 10 MG tablet    ELAVIL     Take 10 mg by mouth        amLODIPine 10 MG tablet    NORVASC     Take 10 mg by mouth        benzonatate 100 MG capsule    TESSALON     Take 100 mg by mouth        calcium carb 1250 mg (500 mg [place])/vitamin D 200 units 500-200 MG-UNIT per tablet    OSCAL with D          cetirizine HCl 10 MG Caps           cyanocobalamin 1000 MCG/ML injection    VITAMIN B12     Inject 1,000 mcg into the muscle        esomeprazole 40 MG CR capsule    nexIUM     Take 40 mg by mouth        ferrous fumarate 65 mg ([place]. FE)-Vitamin C 125 mg  MG Tabs tablet    VITRON C    90 tablet    Take 1 tablet by mouth 3 times daily    Iron deficiency,  History of Valdemar-en-Y gastric bypass       fluticasone 50 MCG/ACT spray    FLONASE     2 sprays        metoprolol 25 MG 24 hr tablet    TOPROL-XL     TAKE 1/2 TABLET BY MOUTH EVERY DAY        omeprazole 20 MG CR capsule    priLOSEC     Take 20 mg by mouth        senna-docusate 8.6-50 MG per tablet    SENOKOT-S;PERICOLACE     Take 1 tab by mouth twice daily as needed for constipation        sucralfate 1 GM tablet    CARAFATE     Take 1 g by mouth        topiramate 25 MG tablet    TOPAMAX    90 tablet    25mg at bedtime for week 1, 50mg at bedtime for 1 week, and 75mg at bedtime thereafter    Non morbid obesity due to excess calories       vitamin D 2000 UNITS tablet      Take 4,000 Units by mouth

## 2017-10-26 NOTE — PROGRESS NOTES
"Return Bariatric Surgery Note    RE: Pamella Landa  MR#: [id number]  : 1950  VISIT DATE: Oct 26, 2017    Dear Cuca Voss,    I had the pleasure of seeing your patient, Pamella Landa, in my post-bariatric surgery assessment clinic.    CHIEF COMPLAINT: Post-bariatric surgery follow-up    HISTORY OF PRESENT ILLNESS:  Questions Regarding Prior Weight Loss Surgery Reviewed With Patient 10/26/2017   I had the following weight loss procedure: Valdemar-en-y Gastric Bypass   What year was your surgery? 2000   How has your weight changed since your last visit? I have gained weight   Are you currently taking any weight loss medications? No   Do you currently have any of the following: Sleep Apnea   Have you been to the Emergency room since your last visit with us? Yes   Were you in the hospital since your last visit with us? No   Do you have any concerns today? yes yumiko i am gaineng wihgt     \"Hx of gastric bypass with Dr Deluca in . She has had epigastric pain that started last year. PCP discontinued her ibuprofen and ASA. She does not smoke. She saw Dr Cochran and she has EGD scheduled soon. Pain is intermittent. Describes it as burning sensation. Pain is worse with some foods but sometimes food helps the pain. Sulcralfate is helping. She is also on PPI\"    Last visit I started topiramate but didn't take for very long because she didn't feel it was working.    Weight History:     10/26/2017   What is your highest lifetime weight? 325   What is your lowest weight since surgery? (In pounds) 150     Initial Weight: 147.4 kg (325 lb)  Current Weight: Weight: 83.5 kg (184 lb 1.6 oz)  Cumulative weight loss (lbs): 140.9  Last Visits Weight: 79.2 kg (174 lb 11.2 oz)    Questions Regarding Co-Morbidities and Health Concerns Reviewed With Patient 10/26/2017   Pre-diabetes: Never   Diabetes II: Never   High Blood Pressure: Improved   High cholesterol: Improved   Heartburn/Reflux: Improved   Sleep apnea: Stayed " the same   Do you use a CPAP? No   PCOS: Improved   Back pain: Improved   Joint pain: Improved   Lower leg swelling: Stayed the same       Eating Habits 10/26/2017   How many meals do you eat per day? 2   Do you snack between meals? No   How much food are you eating at each meal? 1/2 cup to 1 cup   Are you able to separate your meals and liquids by at least 30 minutes? Sometimes   Are you able to avoid liquid calories? Yes       Exercise Questions Reviewed With Patient 10/26/2017   How often do you exercise? 1 to 2 times per week   What is the duration of your exercise (in minutes)? -   What types of exercise do you do? -   What keeps you from being more active?  I should be more active but I just have not gotten around to it, Shortness of breath       Social History:      10/26/2017   Are you smoking? No   Are you drinking alcohol? No       Medications:  Current Outpatient Prescriptions   Medication     ferrous fumarate 65 mg, [place]. FE,-Vitamin C 125 mg (VITRON C)  MG TABS tablet     topiramate (TOPAMAX) 25 MG tablet     acetaminophen (TYLENOL) 500 MG tablet     amLODIPine (NORVASC) 10 MG tablet     benzonatate (TESSALON) 100 MG capsule     calcium carb 1250 mg, 500 mg [place],/vitamin D 200 units (OSCAL WITH D) 500-200 MG-UNIT per tablet     cetirizine HCl 10 MG CAPS     Cholecalciferol (VITAMIN D) 2000 UNITS tablet     cyanocobalamin (VITAMIN B12) 1000 MCG/ML injection     esomeprazole (NEXIUM) 40 MG CR capsule     fluticasone (FLONASE) 50 MCG/ACT spray     metoprolol (TOPROL-XL) 25 MG 24 hr tablet     omeprazole (PRILOSEC) 20 MG CR capsule     senna-docusate (SENOKOT-S;PERICOLACE) 8.6-50 MG per tablet     sucralfate (CARAFATE) 1 GM tablet     amitriptyline (ELAVIL) 10 MG tablet     No current facility-administered medications for this visit.          10/26/2017   Do you avoid NSAIDs such as (Ibuprofen, Aleve, Naproxen, Advil)?   Yes       ROS:  GI:      10/26/2017   Vomiting: No   Diarrhea: No   Constipation:  "Yes   Swallowing trouble: No   Abdominal pain: Yes   Heartburn: No   Rash in skin folds: No   Depression: Yes   Stress urinary incontinence Yes     Skin:   SARA SMITH ROS - SKIN 10/26/2017   Rash in skin folds: No     Psych:      10/26/2017   Depression: Yes   Anxiety: Yes     Female Only:   BAR RBS ROS - FEMALE ONLY 10/26/2017   Female only: None of the above       LABS/IMAGING/MEDICAL RECORDS REVIEW:     PHYSICAL EXAMINATION:  /71 (BP Location: Left arm, Patient Position: Chair, Cuff Size: Adult Regular)  Pulse 62  Ht 1.575 m (5' 2\")  Wt 83.5 kg (184 lb 1.6 oz)  SpO2 99%  BMI 33.67 kg/m2   General: No apparent distress  Neuro: A & O x 3  Head: Atraumatic, normocephalic  Eyes: PERRL, EOMI  Skin: warm and dry, no rashes on exposed skin  Respiratory: respirations unlabored  Abdomen: soft NT ND   Extremities: No LE swelling      ASSESSMENT AND PLAN:      1. 11 years status post laparoscopic gastric bypass.  Iron deficiency anemia. She is taking oral iron to see if this helps before having iron infusion. GERD she previously had is improved.  2. Morbid Obesity current BMI: Body mass index is 33.67 kg/(m^2).  3. Post surgical malabsorption:   Labs ordered per protocol.   Follow food plan per dietitian recommendations.   Continue taking recommended post-op vitamins.  4. Return to clinic in 2-3 months with Amrita Adair  5. Restart topiramate ramp to 75mg  6. Labs today to determine if iron infusion needed.  7. GERD much improved on omeprazole and carafate.         Sincerely,    Amrita Adair PA-C    I spent a total of 15 minutes face to face with Pamella during today's office visit. Over 50% of this time was spent counseling the patient and/or coordinating care.  "

## 2017-10-26 NOTE — PROGRESS NOTES
[facility] Outpatient Medical Nutrition Therapy      Time Spent:  30 minutes  Session Type: Follow up Individual Session  Referring Physician:  Amrita dAair  Reason for RD Visit:   Nutritional counseling, weight manangement     Nutrition Assessment:  Patient is here for follow up visit with Registered Dietitian (RD).  Patient is a 67 y.o. female with history of s/p RNY GB in 2005 with Dr. Deluca. Patient stated that she has been feeling tired especially over the past couple of weeks and reported d/t iron deficiency/hx Fe deficiency. Stated that she tends to skip one meal per day or sometimes two meals per day (lunch and/or dinner). Does not typically snack but occasional has fruit or ~1T peanut butter. Eats a very small breakfast meal. Is not consistently eating balanced meals and not consistently eating protein at all meals. Does not tolerate liquid milk but does tolerate greek yogurt and cheese. See diet recall below.    Progress towards previous goals:   -Try a protein shake meal replacement if not hungry for a meal.  Try Muscle Milk (lactose-free).  You may try other lactose-free ones as long as they meet the following criteria: 200 or less calories, no more than 10 grams of sugar, and at least 20 grams of protein. -Not Met  -Continue to avoid rice, bread, and milk. -Mostly (occasionally has 2T brown rice)  -Focus on lean protein (fish, chicken breast, eggs, low-fat string cheese).  Eat a protein source 3 times/day along with a little vegetable or fruit.-Not meeting  -Continue to take your vitamin/mineral supplementation as recommended. -Meeting    Diet Recall:  (yesterday OR other usual meals/snacks):   Meal Food    Breakfast 2T cottage cheese and banana   Lunch stewed liver with small sweet potato OR skip   Dinner Skip OR Bread OR small cereal with milk OR 2 oz fish with steamed peppers and onions OR 2T brown rice and stew or greens and rice   Snacks Usually None OR fruit OR 1T peanut butter   Beverages 1/2 c  "decaf coffee with cream and 1 tsp brown sugar, 16-50 oz water, 1/2 can caffeine free diet soda    Alcohol Intake denies     Frequency of eating/taking out meals: very seldom. Does not like fast food or eating out.     Height:   Ht Readings from Last 1 Encounters:   10/26/17 1.575 m (5' 2\")     Weight:  Pre-op weight: 325# and lowest post op weight was 150#. Weight up ~6# since last RD visit on 3/31/2017.  Wt Readings from Last 10 Encounters:   10/26/17 83.5 kg (184 lb 1.6 oz)   09/08/17 85.2 kg (187 lb 14.4 oz)   03/31/17 80.7 kg (177 lb 14.4 oz)   03/31/17 80.7 kg (177 lb 14.4 oz)   02/15/17 78.7 kg (173 lb 8 oz)   08/13/14 79.2 kg (174 lb 11.2 oz)      BMI: 33.74 class I obesity  Labs:  Reviewed. Patient reported iron deficiency. Hgb and hematocrit WNL today (10/26/17).  Pertinent Medications/vitamin and mineral supplements:   Vitamin B12 shot, prescription D. Was on MVI but PCP decreased down to 1x/week due to it made you feel more hungry. Took a iron pill prescribed by her doctor but reported it was not working for her so now liquid iron from home country ([facility]) called [employer].   Food Allergies:  NKFA  Physical Activity:  Loves to walk. With good weather and when feeling better does about 2 weeks ago 2-3 miles (2x 45 minutes). None over the past two weeks.    Previous Nutrition Diagnosis:    Inadequate protein intake related to lack of appetite as evidenced by Pt report of poor protein intake (<50 g daily).-Ongoing (and current).    Nutrition Prescription: Recommended general healthful post op bariatric diet of 3 small balanced meals per day with adequate protein at all.    Nutrition Intervention:    Nutrition Education/Counseling:  Provided diet education review related to s/p RNY. Reviewed diet recall and discussed tips and suggestions for meals. Recommended consistently eating 3 small balanced meals with adequate protein at all, 60g each day, no skipping meals. Reviewed protein sources and recommended " portion sizes. Told patient if unable to eat a meal then okay to replace a meal with a protein shake. Reviewed criteria for protein drinks: 200 or less calories, no more than 10 grams of sugar, and at least 20 grams of protein. Encouraged patient to increase water/non caloric/non caffeinated/non carbonated fluids to get at least 64 oz (8 cups) per day. Patient verbalized understanding of education provided. See all recommendations under Goals below.    Educational Materials Provided:  Sources of Protein     Goals:  1. Eat 3 small balanced meals per day with lean protein at all meals. Eat protein foods first then vegetables, fruit and if room whole grain starches/healthy starches. (i.e of protein serving size ~1/2 c cottage cheese, 6 oz greek yogurt, 2-3 oz of lean meat/fish).  2. Try a protein shake meal replacement if not hungry for a meal.  Try Muscle Milk (lactose-free).  You may try other lactose-free ones as long as they meet the following criteria: 200 or less calories, no more than 10 grams of sugar, and at least 20 grams of protein.   3. Increase water to drink at least 3-4 bottles (6-8 cups per day).    Nutrition Monitoring and Evaluation: Will monitor adherence to nutrition recommendations at any future RD visits.     Further Medical Nutrition Therapy: PRN  Next Appointment (if applicable):  Patient considering f/u RD when f/u with PA-C  Patient was encouraged to call/contact RD with any further questions.    Cristina Reed, MS, RD, LD

## 2017-10-26 NOTE — PATIENT INSTRUCTIONS
It was a pleasure meeting you today:    1. Eat 3 small balanced meals per day with lean protein at all meals. Eat protein foods first then vegetables, fruit and if room whole grain starches/healthy starches. (i.e of protein serving size ~1/2 c cottage cheese, 6 oz greek yogurt, 2-3 oz of lean meat/fish).  2. Try a protein shake meal replacement if not hungry for a meal.  Try Muscle Milk (lactose-free).  You may try other lactose-free ones as long as they meet the following criteria: 200 or less calories, no more than 10 grams of sugar, and at least 20 grams of protein.   3. Increase water to drink at least 3-4 bottles (6-8 cups per day).

## 2017-10-26 NOTE — MR AVS SNAPSHOT
After Visit Summary   10/26/2017    Pamella Landa    MRN: [id number]           Patient Information     Date Of Birth          1950        Visit Information        Provider Department      10/26/2017 1:30 PM Amrita Adair PA-C [facility] Surgical Weight Management        Today's Diagnoses     Iron deficiency anemia, unspecified iron deficiency anemia type    -  1    Non morbid obesity due to excess calories          Care Instructions    Return to clinic in 2-3 months with Amrita Adair RBS visit    Restart topiramate ramp to 75mg    Labs today to determine if iron infusion needed.            Follow-ups after your visit        Your next 10 appointments already scheduled     Oct 27, 2017  2:00 PM CDT   (Arrive by 1:45 PM)   NUTRITION VISIT with SHEILA Borja [facility] Surgical Weight Management ([facility])    [address] [place] [address] [phone]              Future tests that were ordered for you today     Open Future Orders        Priority Expected Expires Ordered    Ferritin Routine  10/27/2018 10/26/2017            Who to contact     Please call your clinic at [phone] to:    Ask questions about your health    Make or cancel appointments    Discuss your medicines    Learn about your test results    Speak to your doctor   If you have compliments or concerns about an experience at your clinic, or if you wish to file a complaint, please contact [facility] Patient Relations at [phone] or email us at [email or web address]         Additional Information About Your Visit        MyChart Information     SocialThreadert is an electronic gateway that provides easy, online access to your medical records. With IZEA, you can request a clinic appointment, read your test results, renew a prescription or communicate with your care team.     To sign up for IZEA visit the website at  "[email or web address]   You will be asked to enter the access code listed below, as well as some personal information. Please follow the directions to create your username and password.     Your access code is: [email or web address]  Expires: 2017  6:30 AM     Your access code will  in 90 days. If you need help or a new code, please contact your [facility] or call [phone] for assistance.        Care EveryWhere ID     This is your Care EveryWhere ID. This could be used by other organizations to access your [place] medical records  [id number]        Your Vitals Were     Pulse Height Pulse Oximetry BMI (Body Mass Index)          62 1.575 m (5' 2\") 99% 33.67 kg/m2         Blood Pressure from Last 3 Encounters:   10/26/17 127/71   17 134/71   17 147/79    Weight from Last 3 Encounters:   10/26/17 83.5 kg (184 lb 1.6 oz)   17 85.2 kg (187 lb 14.4 oz)   17 80.7 kg (177 lb 14.4 oz)              We Performed the Following     CBC with platelets          Where to get your medicines      These medications were sent to [id number] - [place], [place] - [address] [place] [address] AT [facility] [address]  [address], [place] [address]     Phone:  [phone]     topiramate 25 MG tablet          Primary Care Provider Office Phone # Fax #    Cuca Voss [phone]       [facility] FOR [place] [address] [address] [facility] [address]        Equal Access to Services     LEAH GIFFORD : Hadarnaldo Parks, larry mckenna, sivan tom. So [facility] [phone].    ATENCIÓN: Si habla español, tiene a burgos disposición servicios gratuitos de asistencia lingüística. Llame al [phone].    We comply with applicable federal civil rights laws and [place] laws. We do not discriminate on the basis of race, color, national origin, age, disability, sex, sexual " orientation, or gender identity.            Thank you!     Thank you for choosing [facility] SURGICAL WEIGHT MANAGEMENT  for your care. Our goal is always to provide you with excellent care. Hearing back from our patients is one way we can continue to improve our services. Please take a few minutes to complete the written survey that you may receive in the mail after your visit with us. Thank you!             Your Updated Medication List - Protect others around you: Learn how to safely use, store and throw away your medicines at www.disposemymeds.org.          This list is accurate as of: 10/26/17  1:46 PM.  Always use your most recent med list.                   Brand Name Dispense Instructions for use Diagnosis    acetaminophen 500 MG tablet    TYLENOL     Take 500-1,000 mg by mouth        amitriptyline 10 MG tablet    ELAVIL     Take 10 mg by mouth        amLODIPine 10 MG tablet    NORVASC     Take 10 mg by mouth        benzonatate 100 MG capsule    TESSALON     Take 100 mg by mouth        calcium carb 1250 mg (500 mg [place])/vitamin D 200 units 500-200 MG-UNIT per tablet    OSCAL with D          cetirizine HCl 10 MG Caps           cyanocobalamin 1000 MCG/ML injection    VITAMIN B12     Inject 1,000 mcg into the muscle        esomeprazole 40 MG CR capsule    nexIUM     Take 40 mg by mouth        ferrous fumarate 65 mg ([place]. FE)-Vitamin C 125 mg  MG Tabs tablet    VITRON C    90 tablet    Take 1 tablet by mouth 3 times daily    Iron deficiency, History of Valdemar-en-Y gastric bypass       fluticasone 50 MCG/ACT spray    FLONASE     2 sprays        metoprolol 25 MG 24 hr tablet    TOPROL-XL     TAKE 1/2 TABLET BY MOUTH EVERY DAY        omeprazole 20 MG CR capsule    priLOSEC     Take 20 mg by mouth        senna-docusate 8.6-50 MG per tablet    SENOKOT-S;PERICOLACE     Take 1 tab by mouth twice daily as needed for constipation        sucralfate 1 GM tablet    CARAFATE     Take 1 g by mouth        topiramate 25 MG  tablet    TOPAMAX    90 tablet    25mg at bedtime for week 1, 50mg at bedtime for 1 week, and 75mg at bedtime thereafter    Non morbid obesity due to excess calories       vitamin D 2000 UNITS tablet      Take 4,000 Units by mouth

## 2018-05-11 ENCOUNTER — OFFICE VISIT (OUTPATIENT)
Dept: SURGERY | Facility: CLINIC | Age: 68
End: 2018-05-11
Payer: COMMERCIAL

## 2018-05-11 VITALS
DIASTOLIC BLOOD PRESSURE: 70 MMHG | OXYGEN SATURATION: 100 % | WEIGHT: 170.6 LBS | BODY MASS INDEX: 31.39 KG/M2 | HEIGHT: 62 IN | HEART RATE: 48 BPM | TEMPERATURE: 98.3 F | SYSTOLIC BLOOD PRESSURE: 132 MMHG

## 2018-05-11 DIAGNOSIS — E66.09 NON MORBID OBESITY DUE TO EXCESS CALORIES: ICD-10-CM

## 2018-05-11 RX ORDER — NYSTATIN 100000 U/G
CREAM TOPICAL
COMMUNITY
Start: 2018-01-24

## 2018-05-11 RX ORDER — ONDANSETRON 4 MG/1
TABLET, ORALLY DISINTEGRATING ORAL
COMMUNITY
Start: 2018-03-06

## 2018-05-11 RX ORDER — PANTOPRAZOLE SODIUM 20 MG/1
20 TABLET, DELAYED RELEASE ORAL
COMMUNITY
Start: 2018-03-13

## 2018-05-11 RX ORDER — TOPIRAMATE 25 MG/1
50 TABLET, FILM COATED ORAL 2 TIMES DAILY
Qty: 120 TABLET | Refills: 3 | Status: SHIPPED | OUTPATIENT
Start: 2018-05-11 | End: 2019-11-14

## 2018-05-11 RX ORDER — TRAZODONE HYDROCHLORIDE 50 MG/1
TABLET, FILM COATED ORAL
COMMUNITY
Start: 2018-02-06

## 2018-05-11 RX ORDER — SUMATRIPTAN 50 MG/1
50 TABLET, FILM COATED ORAL
COMMUNITY

## 2018-05-11 NOTE — MR AVS SNAPSHOT
After Visit Summary   2018    Pamella Landa    MRN: [id number]           Patient Information     Date Of Birth          1950        Visit Information        Provider Department      2018 8:45 AM Amrita Adair PA-C [facility]        Today's Diagnoses     Non morbid obesity due to excess calories           Follow-ups after your visit        Your next 10 appointments already scheduled     May 11, 2018  8:45 AM CDT   (Arrive by 8:30 AM)   RETURN BARIATRIC SURGERY with CHINA Tripathi [facility] Surgical Weight Management ([facility])    [address] [facility]  [address] [facility] [address] [phone]              Who to contact     Please call your clinic at [phone] to:    Ask questions about your health    Make or cancel appointments    Discuss your medicines    Learn about your test results    Speak to your doctor            Additional Information About Your Visit        MyChart Information     Relux is an electronic gateway that provides easy, online access to your medical records. With Relux, you can request a clinic appointment, read your test results, renew a prescription or communicate with your care team.     To sign up for [employer] Found[employer] visit the website at [email or web address]   You will be asked to enter the access code listed below, as well as some personal information. Please follow the directions to create your username and password.     Your access code is: [email or web address]  Expires: 2018  6:31 AM     Your access code will  in 90 days. If you need help or a new code, please contact your [facility] or call [phone] for assistance.        Care EveryWhere ID     This is your Care EveryWhere ID. This could be used by other organizations to access your [place] medical records  [id number]        Your Vitals Were     Pulse Temperature Height  "Pulse Oximetry BMI (Body Mass Index)       48 98.3  F (36.8  C) (Oral) 5' 2\" 100% 31.2 kg/m2        Blood Pressure from Last 3 Encounters:   05/11/18 132/70   10/26/17 127/71   09/08/17 134/71    Weight from Last 3 Encounters:   05/11/18 170 lb 9.6 oz   10/26/17 184 lb 1.6 oz   09/08/17 187 lb 14.4 oz              Today, you had the following     No orders found for display         Today's Medication Changes          These changes are accurate as of 5/11/18  8:33 AM.  If you have any questions, ask your nurse or doctor.               These medicines have changed or have updated prescriptions.        Dose/Directions    topiramate 25 MG tablet   Commonly known as:  TOPAMAX   This may have changed:    - how much to take  - how to take this  - when to take this  - additional instructions   Used for:  Non morbid obesity due to excess calories   Changed by:  Amrita Adair PA-C        Dose:  50 mg   Take 2 tablets (50 mg) by mouth 2 times daily   Quantity:  120 tablet   Refills:  3         Stop taking these medicines if you haven't already. Please contact your care team if you have questions.     sucralfate 1 GM tablet   Commonly known as:  CARAFATE   Stopped by:  Amrita Adair PA-C                Where to get your medicines      These medications were sent to [id number] - [place], [place] - [address] AT [facility] [address]  [address], [place] [address]     Phone:  [phone]     topiramate 25 MG tablet                Primary Care Provider Office Phone # Fax #    Cuca Voss [phone]       [facility] FOR [place] [address] [address] [facility] [address]        Equal Access to Services     LEAH GIFFORD AH: Carson Parks, larry mckenna, qagriselda kaalmakev augustine, sivan osullivan. So [facility] [phone].    ATENCIÓN: Si habla español, tiene a burgos disposición servicios gratuitos de asistencia " lingüística. Carlos al [phone].    We comply with applicable federal civil rights laws and [place] laws. We do not discriminate on the basis of race, color, national origin, age, disability, sex, sexual orientation, or gender identity.            Thank you!     Thank you for choosing [facility] SURGICAL WEIGHT MANAGEMENT  for your care. Our goal is always to provide you with excellent care. Hearing back from our patients is one way we can continue to improve our services. Please take a few minutes to complete the written survey that you may receive in the mail after your visit with us. Thank you!             Your Updated Medication List - Protect others around you: Learn how to safely use, store and throw away your medicines at www.disposemymeds.org.          This list is accurate as of 5/11/18  8:33 AM.  Always use your most recent med list.                   Brand Name Dispense Instructions for use Diagnosis    acetaminophen 500 MG tablet    TYLENOL     Take 500-1,000 mg by mouth        amitriptyline 10 MG tablet    ELAVIL     Take 10 mg by mouth        amLODIPine 10 MG tablet    NORVASC     Take 10 mg by mouth        benzonatate 100 MG capsule    TESSALON     Take 100 mg by mouth        Calcium carb-Vitamin D 500 mg [place]-200 units 500-200 MG-UNIT per tablet    OSCAL with D;Oyster Shell Calcium          cetirizine HCl 10 MG Caps           cyanocobalamin 1000 MCG/ML injection    VITAMIN B12     Inject 1,000 mcg into the muscle        esomeprazole 40 MG CR capsule    nexIUM     Take 40 mg by mouth        ferrous fumarate 65 mg ([place]. FE)-Vitamin C 125 mg  MG Tabs tablet    VITRON C    90 tablet    Take 1 tablet by mouth 3 times daily    Iron deficiency, History of Valdemar-en-Y gastric bypass       FLUoxetine 20 MG capsule    PROzac     Take 20 mg by mouth        fluticasone 50 MCG/ACT spray    FLONASE     2 sprays        melatonin 1 MG Tabs tablet      Take 1 mg by mouth        metoprolol succinate 25 MG 24  hr tablet    TOPROL-XL     TAKE 1/2 TABLET BY MOUTH EVERY DAY        nystatin cream    MYCOSTATIN     Apply to area under breasts twice daily for one week        ondansetron 4 MG ODT tab    ZOFRAN-ODT     I-2 tab BID PRN        pantoprazole 20 MG EC tablet    PROTONIX     Take 20 mg by mouth        senna-docusate 8.6-50 MG per tablet    SENOKOT-S;PERICOLACE     Take 1 tab by mouth twice daily as needed for constipation        SUMAtriptan 50 MG tablet    IMITREX     Take 50 mg by mouth        topiramate 25 MG tablet    TOPAMAX    120 tablet    Take 2 tablets (50 mg) by mouth 2 times daily    Non morbid obesity due to excess calories       traZODone 50 MG tablet    DESYREL     1-2 tablets Qhs  PRN        vitamin D 2000 units tablet      Take 4,000 Units by mouth

## 2018-05-11 NOTE — LETTER
"2018       RE: Pamella Landa  [address] [place] [address]     Dear Colleague,    Thank you for referring your patient, Pamella Landa, to the [facility] SURGICAL WEIGHT MANAGEMENT at [facility]. Please see a copy of my visit note below.        Return Bariatric Surgery Note    RE: Pamella Landa  MR#: [id number]  : 1950  VISIT DATE: May 11, 2018    Dear Cuca Voss,    I had the pleasure of seeing your patient, Pamella Landa, in my post-bariatric surgery assessment clinic.    CHIEF COMPLAINT: Post-bariatric surgery follow-up    HISTORY OF PRESENT ILLNESS:  Questions Regarding Prior Weight Loss Surgery Reviewed With Patient 2018   I had the following weight loss procedure: Valdemar-en-y Gastric Bypass   What year was your surgery? 12 years ago   How has your weight changed since your last visit? I have gained weight   Are you currently taking any weight loss medications? No   Do you currently have any of the following: Sleep Apnea, Hypertension (high blood pressure)?   Have you been to the Emergency room since your last visit with us? Yes   Were you in the hospital since your last visit with us? Yes   Do you have any concerns today? yes aout my michael      Last visit was 10/26/17 and she has lost 14 lbs since then.  Last visit we restarted topiramate. She is tolerating 75mg at night and no side effects.  She is unsure how she lost the 14 lbs since she doesn't think she has made any food changes.  She doesn't feel hungry in between meals.      \"Hx of gastric bypass with Dr Deluca in . She has had epigastric pain that started last year. PCP discontinued her ibuprofen and ASA. She does not smoke. She saw Dr Cochran and she has EGD scheduled soon. Pain is intermittent. Describes it as burning sensation. Pain is worse with some foods but sometimes food helps the pain. Sulcralfate is helping. She is also on PPI\"    Weight History:     " 5/11/2018   What is your highest lifetime weight? 325   What is your lowest weight since surgery? (In pounds) 160     Initial Weight: 325 lb  Current Weight: Weight: 170 lb 9.6 oz  Cumulative weight loss (lbs): 154.4  Last Visits Weight: 184 lb 1.6 oz    Questions Regarding Co-Morbidities and Health Concerns Reviewed With Patient 5/11/2018   Pre-diabetes: Never   Diabetes II: Never   High Blood Pressure: Improved   High cholesterol: Improved   Heartburn/Reflux: Improved   Sleep apnea: Stayed the same   Do you use a CPAP? No   PCOS: Never   Back pain: Improved   Joint pain: Improved   Lower leg swelling: Improved       Eating Habits 5/11/2018   How many meals do you eat per day? 2   Do you snack between meals? No   How much food are you eating at each meal? Less than 1/2 cup   Are you able to separate your meals and liquids by at least 30 minutes? Sometimes   Are you able to avoid liquid calories? Yes       Exercise Questions Reviewed With Patient 5/11/2018   How often do you exercise? Daily   What is the duration of your exercise (in minutes)? 30 Minutes   What types of exercise do you do? walking   What keeps you from being more active?  I am as active as I can possbily be, Pain, My ability to walk or move around is limited, Too tired       Social History:      5/11/2018   Are you smoking? No   Are you drinking alcohol? No       Medications:  Current Outpatient Prescriptions   Medication     acetaminophen (TYLENOL) 500 MG tablet     amitriptyline (ELAVIL) 10 MG tablet     amLODIPine (NORVASC) 10 MG tablet     benzonatate (TESSALON) 100 MG capsule     calcium carb 1250 mg, 500 mg [place],/vitamin D 200 units (OSCAL WITH D) 500-200 MG-UNIT per tablet     cetirizine HCl 10 MG CAPS     Cholecalciferol (VITAMIN D) 2000 UNITS tablet     cyanocobalamin (VITAMIN B12) 1000 MCG/ML injection     esomeprazole (NEXIUM) 40 MG CR capsule     ferrous fumarate 65 mg, [employer]. FE,-Vitamin C 125 mg (VITRON C)  MG TABS tablet      "FLUoxetine (PROZAC) 20 MG capsule     fluticasone (FLONASE) 50 MCG/ACT spray     melatonin 1 MG TABS tablet     metoprolol (TOPROL-XL) 25 MG 24 hr tablet     nystatin (MYCOSTATIN) cream     ondansetron (ZOFRAN-ODT) 4 MG ODT tab     pantoprazole (PROTONIX) 20 MG EC tablet     senna-docusate (SENOKOT-S;PERICOLACE) 8.6-50 MG per tablet     sucralfate (CARAFATE) 1 GM tablet     SUMAtriptan (IMITREX) 50 MG tablet     topiramate (TOPAMAX) 25 MG tablet     traZODone (DESYREL) 50 MG tablet     No current facility-administered medications for this visit.          5/11/2018   Do you avoid NSAIDs such as (Ibuprofen, Aleve, Naproxen, Advil)?   Yes       ROS:  GI:      5/11/2018   Vomiting: No   Diarrhea: No   Constipation: No   Swallowing trouble: No   Abdominal pain: Yes   Heartburn: No   Rash in skin folds: Yes   Depression: Yes   Stress urinary incontinence No     Skin:   BAR RBS ROS - SKIN 5/11/2018   Rash in skin folds: Yes     Psych:      5/11/2018   Depression: Yes   Anxiety: Yes     Female Only:   BAR RBS ROS - FEMALE ONLY 5/11/2018   Female only: None of the above       LABS/IMAGING/MEDICAL RECORDS REVIEW:     PHYSICAL EXAMINATION:  /70  Pulse (!) 48  Temp 98.3  F (36.8  C) (Oral)  Ht 5' 2\"  Wt 170 lb 9.6 oz  SpO2 100%  BMI 31.2 kg/m2   General: No apparent distress  Neuro: A & O x 3  Head: Atraumatic, normocephalic  Eyes: PERRL, EOMI  Skin: warm and dry, no rashes on exposed skin  Respiratory: respirations unlabored  Abdomen: soft NT ND  Extremities: No LE swelling      ASSESSMENT AND PLAN:      1. 11.5 years status laparoscopic gastric bypass  2. Morbid Obesity current BMI: Body mass index is 31.2 kg/(m^2).  3. Post surgical malabsorption:   Labs ordered per protocol.   Follow food plan per dietitian recommendations.   Continue taking recommended post-op vitamins.  4. Return to clinic in 4 months.  5. Increase topiramate to 50 mg twice daily.  6. Continue oral iron  7. Check ferritin and HgB next " visit      Sincerely,    Amrita Adair PA-C    I spent a total of 15 minutes face to face with Pamella during today's office visit. Over 50% of this time was spent counseling the patient and/or coordinating care.    Again, thank you for allowing me to participate in the care of your patient.      Sincerely,    Amrita Adair PA-C

## 2018-05-11 NOTE — PROGRESS NOTES
"    Return Bariatric Surgery Note    RE: Pamella Landa  MR#: [id number]  : 1950  VISIT DATE: May 11, 2018    Dear Cuca Voss,    I had the pleasure of seeing your patient, Pamella Landa, in my post-bariatric surgery assessment clinic.    CHIEF COMPLAINT: Post-bariatric surgery follow-up    HISTORY OF PRESENT ILLNESS:  Questions Regarding Prior Weight Loss Surgery Reviewed With Patient 2018   I had the following weight loss procedure: Valdemar-en-y Gastric Bypass   What year was your surgery? 12 years ago   How has your weight changed since your last visit? I have gained weight   Are you currently taking any weight loss medications? No   Do you currently have any of the following: Sleep Apnea, Hypertension (high blood pressure)?   Have you been to the Emergency room since your last visit with us? Yes   Were you in the hospital since your last visit with us? Yes   Do you have any concerns today? yes aout my michael      Last visit was 10/26/17 and she has lost 14 lbs since then.  Last visit we restarted topiramate. She is tolerating 75mg at night and no side effects.  She is unsure how she lost the 14 lbs since she doesn't think she has made any food changes.  She doesn't feel hungry in between meals.      \"Hx of gastric bypass with Dr Deluca in . She has had epigastric pain that started last year. PCP discontinued her ibuprofen and ASA. She does not smoke. She saw Dr Cochran and she has EGD scheduled soon. Pain is intermittent. Describes it as burning sensation. Pain is worse with some foods but sometimes food helps the pain. Sulcralfate is helping. She is also on PPI\"    Weight History:     2018   What is your highest lifetime weight? 325   What is your lowest weight since surgery? (In pounds) 160     Initial Weight: 325 lb  Current Weight: Weight: 170 lb 9.6 oz  Cumulative weight loss (lbs): 154.4  Last Visits Weight: 184 lb 1.6 oz    Questions Regarding Co-Morbidities and Health " Concerns Reviewed With Patient 5/11/2018   Pre-diabetes: Never   Diabetes II: Never   High Blood Pressure: Improved   High cholesterol: Improved   Heartburn/Reflux: Improved   Sleep apnea: Stayed the same   Do you use a CPAP? No   PCOS: Never   Back pain: Improved   Joint pain: Improved   Lower leg swelling: Improved       Eating Habits 5/11/2018   How many meals do you eat per day? 2   Do you snack between meals? No   How much food are you eating at each meal? Less than 1/2 cup   Are you able to separate your meals and liquids by at least 30 minutes? Sometimes   Are you able to avoid liquid calories? Yes       Exercise Questions Reviewed With Patient 5/11/2018   How often do you exercise? Daily   What is the duration of your exercise (in minutes)? 30 Minutes   What types of exercise do you do? walking   What keeps you from being more active?  I am as active as I can possbily be, Pain, My ability to walk or move around is limited, Too tired       Social History:      5/11/2018   Are you smoking? No   Are you drinking alcohol? No       Medications:  Current Outpatient Prescriptions   Medication     acetaminophen (TYLENOL) 500 MG tablet     amitriptyline (ELAVIL) 10 MG tablet     amLODIPine (NORVASC) 10 MG tablet     benzonatate (TESSALON) 100 MG capsule     calcium carb 1250 mg, 500 mg [place],/vitamin D 200 units (OSCAL WITH D) 500-200 MG-UNIT per tablet     cetirizine HCl 10 MG CAPS     Cholecalciferol (VITAMIN D) 2000 UNITS tablet     cyanocobalamin (VITAMIN B12) 1000 MCG/ML injection     esomeprazole (NEXIUM) 40 MG CR capsule     ferrous fumarate 65 mg, [place]. FE,-Vitamin C 125 mg (VITRON C)  MG TABS tablet     FLUoxetine (PROZAC) 20 MG capsule     fluticasone (FLONASE) 50 MCG/ACT spray     melatonin 1 MG TABS tablet     metoprolol (TOPROL-XL) 25 MG 24 hr tablet     nystatin (MYCOSTATIN) cream     ondansetron (ZOFRAN-ODT) 4 MG ODT tab     pantoprazole (PROTONIX) 20 MG EC tablet     senna-docusate  "(SENOKOT-S;PERICOLACE) 8.6-50 MG per tablet     sucralfate (CARAFATE) 1 GM tablet     SUMAtriptan (IMITREX) 50 MG tablet     topiramate (TOPAMAX) 25 MG tablet     traZODone (DESYREL) 50 MG tablet     No current facility-administered medications for this visit.          5/11/2018   Do you avoid NSAIDs such as (Ibuprofen, Aleve, Naproxen, Advil)?   Yes       ROS:  GI:      5/11/2018   Vomiting: No   Diarrhea: No   Constipation: No   Swallowing trouble: No   Abdominal pain: Yes   Heartburn: No   Rash in skin folds: Yes   Depression: Yes   Stress urinary incontinence No     Skin:   BAR RBS ROS - SKIN 5/11/2018   Rash in skin folds: Yes     Psych:      5/11/2018   Depression: Yes   Anxiety: Yes     Female Only:   BAR RBS ROS - FEMALE ONLY 5/11/2018   Female only: None of the above       LABS/IMAGING/MEDICAL RECORDS REVIEW:     PHYSICAL EXAMINATION:  /70  Pulse (!) 48  Temp 98.3  F (36.8  C) (Oral)  Ht 5' 2\"  Wt 170 lb 9.6 oz  SpO2 100%  BMI 31.2 kg/m2   General: No apparent distress  Neuro: A & O x 3  Head: Atraumatic, normocephalic  Eyes: PERRL, EOMI  Skin: warm and dry, no rashes on exposed skin  Respiratory: respirations unlabored  Abdomen: soft NT ND  Extremities: No LE swelling      ASSESSMENT AND PLAN:      1. 11.5 years status laparoscopic gastric bypass  2. Morbid Obesity current BMI: Body mass index is 31.2 kg/(m^2).  3. Post surgical malabsorption:   Labs ordered per protocol.   Follow food plan per dietitian recommendations.   Continue taking recommended post-op vitamins.  4. Return to clinic in 4 months.  5. Increase topiramate to 50 mg twice daily.  6. Continue oral iron  7. Check ferritin and HgB next visit      Sincerely,    Amrita Adair PA-C    I spent a total of 15 minutes face to face with Pamella during today's office visit. Over 50% of this time was spent counseling the patient and/or coordinating care.  "

## 2018-05-14 RX ORDER — TOPIRAMATE 25 MG/1
50 TABLET, FILM COATED ORAL 2 TIMES DAILY
Qty: 360 TABLET | Refills: 0 | OUTPATIENT
Start: 2018-05-14

## 2019-10-16 DIAGNOSIS — E66.09 NON MORBID OBESITY DUE TO EXCESS CALORIES: ICD-10-CM

## 2019-10-19 RX ORDER — TOPIRAMATE 25 MG/1
TABLET, FILM COATED ORAL
Qty: 120 TABLET | Refills: 0 | OUTPATIENT
Start: 2019-10-19

## 2019-10-19 NOTE — TELEPHONE ENCOUNTER
Recieved refill request for topiramate (TOPAMAX) 25 MG tablet . Patient needs appointment scheduled prior to any refills. Clinic Coordinator notified and will follow up with the patient as appropriate. The pharmacy has been notified that the medication will not be refilled prior to an appointment being scheduled.    Scheduling has been notified to contact the pt for appointment.  FYI to Clinic Nurse

## 2019-11-14 ENCOUNTER — OFFICE VISIT (OUTPATIENT)
Dept: ENDOCRINOLOGY | Facility: CLINIC | Age: 69
End: 2019-11-14
Payer: COMMERCIAL

## 2019-11-14 VITALS
HEART RATE: 67 BPM | BODY MASS INDEX: 34.37 KG/M2 | HEIGHT: 62 IN | SYSTOLIC BLOOD PRESSURE: 131 MMHG | OXYGEN SATURATION: 100 % | TEMPERATURE: 98.7 F | DIASTOLIC BLOOD PRESSURE: 73 MMHG | WEIGHT: 186.8 LBS

## 2019-11-14 DIAGNOSIS — Z98.84 BARIATRIC SURGERY STATUS: Primary | ICD-10-CM

## 2019-11-14 DIAGNOSIS — K90.9 INTESTINAL MALABSORPTION, UNSPECIFIED TYPE: ICD-10-CM

## 2019-11-14 DIAGNOSIS — E66.09 NON MORBID OBESITY DUE TO EXCESS CALORIES: ICD-10-CM

## 2019-11-14 DIAGNOSIS — Z98.84 BARIATRIC SURGERY STATUS: ICD-10-CM

## 2019-11-14 LAB
DEPRECATED CALCIDIOL+CALCIFEROL SERPL-MC: 34 UG/L (ref 20–75)
ERYTHROCYTE [DISTWIDTH] IN BLOOD BY AUTOMATED COUNT: 15 % (ref 10–15)
FERRITIN SERPL-MCNC: 36 NG/ML (ref 8–252)
HCT VFR BLD AUTO: 43.5 % (ref 35–47)
HGB BLD-MCNC: 13.7 G/DL (ref 11.7–15.7)
MCH RBC QN AUTO: 27.6 PG (ref 26.5–33)
MCHC RBC AUTO-ENTMCNC: 31.5 G/DL (ref 31.5–36.5)
MCV RBC AUTO: 88 FL (ref 78–100)
PLATELET # BLD AUTO: 237 10E9/L (ref 150–450)
RBC # BLD AUTO: 4.96 10E12/L (ref 3.8–5.2)
WBC # BLD AUTO: 4.4 10E9/L (ref 4–11)

## 2019-11-14 RX ORDER — TOPIRAMATE 25 MG/1
TABLET, FILM COATED ORAL
Qty: 540 TABLET | Refills: 5 | OUTPATIENT
Start: 2019-11-14

## 2019-11-14 RX ORDER — TOPIRAMATE 25 MG/1
75 TABLET, FILM COATED ORAL 2 TIMES DAILY
Qty: 180 TABLET | Refills: 5 | Status: SHIPPED | OUTPATIENT
Start: 2019-11-14 | End: 2020-06-01

## 2019-11-14 RX ORDER — TOPIRAMATE 25 MG/1
50 TABLET, FILM COATED ORAL 2 TIMES DAILY
Qty: 120 TABLET | Refills: 5 | Status: SHIPPED | OUTPATIENT
Start: 2019-11-14 | End: 2019-11-14

## 2019-11-14 ASSESSMENT — PAIN SCALES - GENERAL: PAINLEVEL: MODERATE PAIN (5)

## 2019-11-14 ASSESSMENT — MIFFLIN-ST. JEOR: SCORE: 1335.57

## 2019-11-14 NOTE — TELEPHONE ENCOUNTER
topiramate (TOPAMAX) 25 MG tablet       Last Written Prescription Date:    11/14/19 and discontinued 11/14/19    Last Fill Quantity: 120,   # refills: 5  Last Office Visit : 11/14/19  Future Office visit:  none    Routing refill request to provider for review/approval because:    topiramate (TOPAMAX) 25 MG tablet    Sig - Route: Take 2 tablets (50 mg) by mouth 2 times daily.      Sig/daily dosage not active on patient's medication list

## 2019-11-14 NOTE — LETTER
"2019       RE: Pamella Landa  [address]  [place] [address]     Dear Colleague,    Thank you for referring your patient, Pamella Landa, to the [facility] MEDICAL WEIGHT MANAGEMENT at [facility]. Please see a copy of my visit note below.        Return Medical Weight Management Note     Pamella Landa  MRN:  [id number]  :  1950  FRANCESCO:  2019    Dear Cuca Voss,    I had the pleasure of seeing your patient Pamella Landa.  She is a 69 year old female who I am continuing to see for treatment of obesity related to:       2018   I have the following co-morbidities associated with obesity: -   Do you use a CPAP? No       INTERVAL HISTORY:  [facility] follow up. Last visit 19 with me.  She has gained 16 lbs since last visit.   Last visit we increased topiramate to 100mg daily and continued iron. She is not having any side effect  She has gained 16 lbs since last visit.      CURRENT WEIGHT:   186 lbs 12.8 oz    Wt Readings from Last 4 Encounters:   19 84.7 kg (186 lb 12.8 oz)   18 77.4 kg (170 lb 9.6 oz)   10/26/17 83.5 kg (184 lb 1.6 oz)   17 85.2 kg (187 lb 14.4 oz)       Height:  5' 2\"  Body Mass Index:  Body mass index is 34.17 kg/m .  Vitals:  /73 (BP Location: Left arm, Patient Position: Sitting, Cuff Size: Adult Regular)   Pulse 67   Temp 98.7  F (37.1  C) (Oral)   Ht 1.575 m (5' 2\")   Wt 84.7 kg (186 lb 12.8 oz)   SpO2 100%   BMI 34.17 kg/m       Initial Weight: 325 lb  Current Weight: Weight:  186 lbs  Cumulative weight loss (lbs): 154.4  Last Visits Weight: 184 lb 1.6 oz    Diet and Activity Changes Since Last Visit Reviewed With Patient 2019   I have made the following changes to my diet since my last visit: tryig to eat   I have made the following changes to my activity/exercise since my last visit: still walking       MEDICATIONS:   Current Outpatient Medications   Medication     acetaminophen " (TYLENOL) 500 MG tablet     amitriptyline (ELAVIL) 10 MG tablet     amLODIPine (NORVASC) 10 MG tablet     benzonatate (TESSALON) 100 MG capsule     calcium carb 1250 mg, 500 mg [place],/vitamin D 200 units (OSCAL WITH D) 500-200 MG-UNIT per tablet     cetirizine HCl 10 MG CAPS     Cholecalciferol (VITAMIN D) 2000 UNITS tablet     cyanocobalamin (VITAMIN B12) 1000 MCG/ML injection     esomeprazole (NEXIUM) 40 MG CR capsule     ferrous fumarate 65 mg, [place]. FE,-Vitamin C 125 mg (VITRON C)  MG TABS tablet     FLUoxetine (PROZAC) 20 MG capsule     fluticasone (FLONASE) 50 MCG/ACT spray     melatonin 1 MG TABS tablet     metoprolol (TOPROL-XL) 25 MG 24 hr tablet     nystatin (MYCOSTATIN) cream     ondansetron (ZOFRAN-ODT) 4 MG ODT tab     pantoprazole (PROTONIX) 20 MG EC tablet     senna-docusate (SENOKOT-S;PERICOLACE) 8.6-50 MG per tablet     SUMAtriptan (IMITREX) 50 MG tablet     topiramate (TOPAMAX) 25 MG tablet     traZODone (DESYREL) 50 MG tablet     No current facility-administered medications for this visit.        Weight Loss Medication History Reviewed With Patient 11/14/2019   Which weight loss medications are you currently taking on a regular basis?  None   Are you having any side effects from the weight loss medication that we have prescribed you? No       ASSESSMENT:   12 years s/p laparoscopic gastric bypass with some weight regain (30 lbs) She has recently gained 16 lbs and would like to lose weight that she has regained.     PLAN:   Ferritin, CMP, CBC and vitamin D check today on the first floor  Continue topiramate and increase to 75mg twice daily      FOLLOW-UP:    6 months.    Time: 15 min spent on evaluation, management, counseling, education, & motivational interviewing with greater than 50 % of the total time was spent on counseling and coordinating care    Sincerely,    Amrita Adair PA-C

## 2019-11-14 NOTE — PATIENT INSTRUCTIONS
PLAN:   Ferritin, CMP, CBC and vitamin D check today on the first floor  Continue topiramate and increase to 75mg twice daily      FOLLOW-UP:    6 months.

## 2019-11-14 NOTE — PROGRESS NOTES
"    Return Medical Weight Management Note     Pamella Landa  MRN:  [id number]  :  1950  FRANCESCO:  2019    Dear Cuca Voss,    I had the pleasure of seeing your patient Pamella Landa.  She is a 69 year old female who I am continuing to see for treatment of obesity related to:       2018   I have the following co-morbidities associated with obesity: -   Do you use a CPAP? No       INTERVAL HISTORY:  MW follow up. Last visit 19 with me.  She has gained 16 lbs since last visit.   Last visit we increased topiramate to 100mg daily and continued iron. She is not having any side effect  She has gained 16 lbs since last visit.      CURRENT WEIGHT:   186 lbs 12.8 oz    Wt Readings from Last 4 Encounters:   19 84.7 kg (186 lb 12.8 oz)   18 77.4 kg (170 lb 9.6 oz)   10/26/17 83.5 kg (184 lb 1.6 oz)   17 85.2 kg (187 lb 14.4 oz)       Height:  5' 2\"  Body Mass Index:  Body mass index is 34.17 kg/m .  Vitals:  /73 (BP Location: Left arm, Patient Position: Sitting, Cuff Size: Adult Regular)   Pulse 67   Temp 98.7  F (37.1  C) (Oral)   Ht 1.575 m (5' 2\")   Wt 84.7 kg (186 lb 12.8 oz)   SpO2 100%   BMI 34.17 kg/m      Initial Weight: 325 lb  Current Weight: Weight: 186 lbs  Cumulative weight loss (lbs): 154.4  Last Visits Weight: 184 lb 1.6 oz    Diet and Activity Changes Since Last Visit Reviewed With Patient 2019   I have made the following changes to my diet since my last visit: tryig to eat   I have made the following changes to my activity/exercise since my last visit: still walking       MEDICATIONS:   Current Outpatient Medications   Medication     acetaminophen (TYLENOL) 500 MG tablet     amitriptyline (ELAVIL) 10 MG tablet     amLODIPine (NORVASC) 10 MG tablet     benzonatate (TESSALON) 100 MG capsule     calcium carb 1250 mg, 500 mg [place],/vitamin D 200 units (OSCAL WITH D) 500-200 MG-UNIT per tablet     cetirizine HCl 10 MG CAPS     Cholecalciferol " (VITAMIN D) 2000 UNITS tablet     cyanocobalamin (VITAMIN B12) 1000 MCG/ML injection     esomeprazole (NEXIUM) 40 MG CR capsule     ferrous fumarate 65 mg, [place]. FE,-Vitamin C 125 mg (VITRON C)  MG TABS tablet     FLUoxetine (PROZAC) 20 MG capsule     fluticasone (FLONASE) 50 MCG/ACT spray     melatonin 1 MG TABS tablet     metoprolol (TOPROL-XL) 25 MG 24 hr tablet     nystatin (MYCOSTATIN) cream     ondansetron (ZOFRAN-ODT) 4 MG ODT tab     pantoprazole (PROTONIX) 20 MG EC tablet     senna-docusate (SENOKOT-S;PERICOLACE) 8.6-50 MG per tablet     SUMAtriptan (IMITREX) 50 MG tablet     topiramate (TOPAMAX) 25 MG tablet     traZODone (DESYREL) 50 MG tablet     No current facility-administered medications for this visit.        Weight Loss Medication History Reviewed With Patient 11/14/2019   Which weight loss medications are you currently taking on a regular basis?  None   Are you having any side effects from the weight loss medication that we have prescribed you? No       ASSESSMENT:   12 years s/p laparoscopic gastric bypass with some weight regain (30 lbs) She has recently gained 16 lbs and would like to lose weight that she has regained.     PLAN:   Ferritin, CMP, CBC and vitamin D check today on the first floor  Continue topiramate and increase to 75mg twice daily      FOLLOW-UP:    6 months.    Time: 15 min spent on evaluation, management, counseling, education, & motivational interviewing with greater than 50 % of the total time was spent on counseling and coordinating care    Sincerely,    Amrita Adair PA-C

## 2019-11-14 NOTE — NURSING NOTE
"Chief Complaint   Patient presents with     RECHECK     Follow up weight mangement.       Vitals:    11/14/19 1242   BP: 131/73   BP Location: Left arm   Patient Position: Sitting   Cuff Size: Adult Regular   Pulse: 67   Temp: 98.7  F (37.1  C)   TempSrc: Oral   SpO2: 100%   Weight: 84.7 kg (186 lb 12.8 oz)   Height: 1.575 m (5' 2\")       Body mass index is 34.17 kg/m .                            FIDENCIO PINEDA, EMT    "

## 2019-11-18 RX ORDER — TOPIRAMATE 25 MG/1
TABLET, FILM COATED ORAL
Qty: 360 TABLET | Refills: 0
Start: 2019-11-18

## 2019-11-18 NOTE — TELEPHONE ENCOUNTER
Refill for Topiramate 50mg BID refused. Patient was increased in dose to 75mg BID at visit on Thursday 11/14/19 with Amrita Adair PA-C. Called pharmacy same day to notify of dosing change. Refill refused.

## 2020-05-13 ENCOUNTER — DOCUMENTATION ONLY (OUTPATIENT)
Dept: CARE COORDINATION | Facility: CLINIC | Age: 70
End: 2020-05-13

## 2020-06-01 ENCOUNTER — VIRTUAL VISIT (OUTPATIENT)
Dept: ENDOCRINOLOGY | Facility: CLINIC | Age: 70
End: 2020-06-01
Payer: COMMERCIAL

## 2020-06-01 DIAGNOSIS — E66.09 NON MORBID OBESITY DUE TO EXCESS CALORIES: ICD-10-CM

## 2020-06-01 RX ORDER — TOPIRAMATE 100 MG/1
100 TABLET, FILM COATED ORAL 2 TIMES DAILY
Qty: 60 TABLET | Refills: 3 | Status: SHIPPED | OUTPATIENT
Start: 2020-06-01 | End: 2020-11-09

## 2020-06-01 RX ORDER — TOPIRAMATE 25 MG/1
75 TABLET, FILM COATED ORAL 2 TIMES DAILY
Qty: 180 TABLET | Refills: 5 | Status: SHIPPED | OUTPATIENT
Start: 2020-06-01 | End: 2020-11-09

## 2020-06-01 NOTE — NURSING NOTE
Unable to reach patient for check in for appointment with Amrita Adair PA-C.      Yary Dominguez, CMA

## 2020-06-01 NOTE — LETTER
"2020       RE: Pamella Landa  [address] [place] [address]     Dear Colleague,    Thank you for referring your patient, Pamella Landa, to the [facility] MEDICAL WEIGHT MANAGEMENT at [facility]. Please see a copy of my visit note below.    Pamella Landa is a 70 year old female who is being evaluated via a billable telephone visit.      The patient has been notified of following:     \"This telephone visit will be conducted via a call between you and your physician/provider. We have found that certain health care needs can be provided without the need for a physical exam.  This service lets us provide the care you need with a short phone conversation.  If a prescription is necessary we can send it directly to your pharmacy.  If lab work is needed we can place an order for that and you can then stop by our lab to have the test done at a later time.    Telephone visits are billed at different rates depending on your insurance coverage. During this emergency period, for some insurers they may be billed the same as an in-person visit.  Please reach out to your insurance provider with any questions.    If during the course of the call the physician/provider feels a telephone visit is not appropriate, you will not be charged for this service.\"    Patient has given verbal consent for Telephone visit?  Yes    What phone number would you like to be contacted at?     How would you like to obtain your AVS? MyChart         Return Medical Weight Management Note     Pamella Landa  MRN:  [id number]  :  1950  FRANCESCO:  2020    Dear Cuca Voss MD,    I had the pleasure of seeing your patient Pamella Landa. She is a 70 year old female who I am continuing to see for treatment of obesity related to:       2/15/2017   I have the following health issues associated with obesity: Hypertension (High Blood Pressure), Hyperlipidemia (High Cholesterol), Back Pain   I " have the following symptoms associated with obesity: Back Pain       INTERVAL HISTORY:  Last visit 11/14/19  12.5 yrs s/p gastric bypass    Initial Weight: 325 lb  Lowest weight after gastric bypass: 150 lbs  Last visit 186 lbs.   No scale at home  Feels she is gaining weight    Left leg pain lateral and above knee.  She went to PCP and ordered US and no blood clots. Seeing PCP today       CURRENT WEIGHT:   0 lbs 0 oz Unsure no scale at home      MEDICATIONS:   Current Outpatient Medications   Medication Sig Dispense Refill     acetaminophen (TYLENOL) 500 MG tablet Take 500-1,000 mg by mouth       amitriptyline (ELAVIL) 10 MG tablet Take 10 mg by mouth       amLODIPine (NORVASC) 10 MG tablet Take 10 mg by mouth       benzonatate (TESSALON) 100 MG capsule Take 100 mg by mouth       calcium carb 1250 mg, 500 mg [place],/vitamin D 200 units (OSCAL WITH D) 500-200 MG-UNIT per tablet        cetirizine HCl 10 MG CAPS        Cholecalciferol (VITAMIN D) 2000 UNITS tablet Take 4,000 Units by mouth       cyanocobalamin (VITAMIN B12) 1000 MCG/ML injection Inject 1,000 mcg into the muscle       esomeprazole (NEXIUM) 40 MG CR capsule Take 40 mg by mouth       ferrous fumarate 65 mg, [place]. FE,-Vitamin C 125 mg (VITRON C)  MG TABS tablet Take 1 tablet by mouth 3 times daily 90 tablet 1     fluticasone (FLONASE) 50 MCG/ACT spray 2 sprays       melatonin 1 MG TABS tablet Take 1 mg by mouth       metoprolol (TOPROL-XL) 25 MG 24 hr tablet TAKE 1/2 TABLET BY MOUTH EVERY DAY       nystatin (MYCOSTATIN) cream Apply to area under breasts twice daily for one week       ondansetron (ZOFRAN-ODT) 4 MG ODT tab I-2 tab BID PRN       pantoprazole (PROTONIX) 20 MG EC tablet Take 20 mg by mouth       senna-docusate (SENOKOT-S;PERICOLACE) 8.6-50 MG per tablet Take 1 tab by mouth twice daily as needed for constipation       SUMAtriptan (IMITREX) 50 MG tablet Take 50 mg by mouth       topiramate (TOPAMAX) 25 MG tablet Take 3 tablets (75 mg) by  mouth 2 times daily 180 tablet 5     traZODone (DESYREL) 50 MG tablet 1-2 tablets Qhs  PRN         Weight Loss Medication History Reviewed With Patient 11/14/2019   Which weight loss medications are you currently taking on a regular basis?  None   Are you having any side effects from the weight loss medication that we have prescribed you? No       ASSESSMENT/PLAN:    12.5 yrs s/p RYGB with 30 lbs weight regain. Taking topiramate 75mg twice daily. Unsure what her weight is today as she doesn't have a scale.  She feels she is gaining weight recently. She is having left leg pain and seeing PCP today.    Increase topiramate to 100mg twice daily      FOLLOW-UP:    12 weeks.      Sincerely,    Amrita Adair PA-C    Phone call duration: 12 minutes    Amrita Adair PA-C

## 2020-06-01 NOTE — PROGRESS NOTES
"Pamella Landa is a 70 year old female who is being evaluated via a billable telephone visit.      The patient has been notified of following:     \"This telephone visit will be conducted via a call between you and your physician/provider. We have found that certain health care needs can be provided without the need for a physical exam.  This service lets us provide the care you need with a short phone conversation.  If a prescription is necessary we can send it directly to your pharmacy.  If lab work is needed we can place an order for that and you can then stop by our lab to have the test done at a later time.    Telephone visits are billed at different rates depending on your insurance coverage. During this emergency period, for some insurers they may be billed the same as an in-person visit.  Please reach out to your insurance provider with any questions.    If during the course of the call the physician/provider feels a telephone visit is not appropriate, you will not be charged for this service.\"    Patient has given verbal consent for Telephone visit?  Yes    What phone number would you like to be contacted at?     How would you like to obtain your AVS? MyChart         Return Medical Weight Management Note     Pamella Landa  MRN:  [id number]  :  1950  FRANCESCO:  2020    Dear Cuca Voss MD,    I had the pleasure of seeing your patient Pamella Landa. She is a 70 year old female who I am continuing to see for treatment of obesity related to:       2/15/2017   I have the following health issues associated with obesity: Hypertension (High Blood Pressure), Hyperlipidemia (High Cholesterol), Back Pain   I have the following symptoms associated with obesity: Back Pain       INTERVAL HISTORY:  Last visit 19  12.5 yrs s/p gastric bypass    Initial Weight: 325 lb  Lowest weight after gastric bypass: 150 lbs  Last visit 186 lbs.   No scale at home  Feels she is gaining weight    Left leg pain " lateral and above knee.  She went to PCP and ordered US and no blood clots. Seeing PCP today       CURRENT WEIGHT:   0 lbs 0 oz Unsure no scale at home      MEDICATIONS:   Current Outpatient Medications   Medication Sig Dispense Refill     acetaminophen (TYLENOL) 500 MG tablet Take 500-1,000 mg by mouth       amitriptyline (ELAVIL) 10 MG tablet Take 10 mg by mouth       amLODIPine (NORVASC) 10 MG tablet Take 10 mg by mouth       benzonatate (TESSALON) 100 MG capsule Take 100 mg by mouth       calcium carb 1250 mg, 500 mg [place],/vitamin D 200 units (OSCAL WITH D) 500-200 MG-UNIT per tablet        cetirizine HCl 10 MG CAPS        Cholecalciferol (VITAMIN D) 2000 UNITS tablet Take 4,000 Units by mouth       cyanocobalamin (VITAMIN B12) 1000 MCG/ML injection Inject 1,000 mcg into the muscle       esomeprazole (NEXIUM) 40 MG CR capsule Take 40 mg by mouth       ferrous fumarate 65 mg, [place]. FE,-Vitamin C 125 mg (VITRON C)  MG TABS tablet Take 1 tablet by mouth 3 times daily 90 tablet 1     fluticasone (FLONASE) 50 MCG/ACT spray 2 sprays       melatonin 1 MG TABS tablet Take 1 mg by mouth       metoprolol (TOPROL-XL) 25 MG 24 hr tablet TAKE 1/2 TABLET BY MOUTH EVERY DAY       nystatin (MYCOSTATIN) cream Apply to area under breasts twice daily for one week       ondansetron (ZOFRAN-ODT) 4 MG ODT tab I-2 tab BID PRN       pantoprazole (PROTONIX) 20 MG EC tablet Take 20 mg by mouth       senna-docusate (SENOKOT-S;PERICOLACE) 8.6-50 MG per tablet Take 1 tab by mouth twice daily as needed for constipation       SUMAtriptan (IMITREX) 50 MG tablet Take 50 mg by mouth       topiramate (TOPAMAX) 25 MG tablet Take 3 tablets (75 mg) by mouth 2 times daily 180 tablet 5     traZODone (DESYREL) 50 MG tablet 1-2 tablets Qhs  PRN         Weight Loss Medication History Reviewed With Patient 11/14/2019   Which weight loss medications are you currently taking on a regular basis?  None   Are you having any side effects from the weight  loss medication that we have prescribed you? No       ASSESSMENT/PLAN:    12.5 yrs s/p RYGB with 30 lbs weight regain. Taking topiramate 75mg twice daily. Unsure what her weight is today as she doesn't have a scale.  She feels she is gaining weight recently. She is having left leg pain and seeing PCP today.    Increase topiramate to 100mg twice daily      FOLLOW-UP:    12 weeks.      Sincerely,    Amrita Adair PA-C    Phone call duration: 12 minutes    Amrita Adair PA-C

## 2020-06-03 RX ORDER — TOPIRAMATE 100 MG/1
TABLET, FILM COATED ORAL
Qty: 180 TABLET | OUTPATIENT
Start: 2020-06-03

## 2020-06-09 ENCOUNTER — RECORDS - HEALTHEAST (OUTPATIENT)
Dept: ADMINISTRATIVE | Facility: OTHER | Age: 70
End: 2020-06-09

## 2020-06-19 ENCOUNTER — HOSPITAL ENCOUNTER (OUTPATIENT)
Dept: NUCLEAR MEDICINE | Facility: CLINIC | Age: 70
Discharge: HOME | End: 2020-06-19
Attending: ORTHOPAEDIC SURGERY

## 2020-06-19 DIAGNOSIS — Z96.659 HISTORY OF TOTAL KNEE ARTHROPLASTY: ICD-10-CM

## 2020-09-26 DIAGNOSIS — E66.09 NON MORBID OBESITY DUE TO EXCESS CALORIES: ICD-10-CM

## 2020-09-30 RX ORDER — TOPIRAMATE 100 MG/1
100 TABLET, FILM COATED ORAL 2 TIMES DAILY
Qty: 180 TABLET | OUTPATIENT
Start: 2020-09-30

## 2020-09-30 NOTE — TELEPHONE ENCOUNTER
Refill denied at this time. Patient needs appointment with /paige /CHINA chavez. Message to schedulers to make virtual appointment.

## 2020-09-30 NOTE — TELEPHONE ENCOUNTER
TOPIRAMATE 100MG TABLETS    Last Written Prescription Date:  6/1/2020  Last Fill Quantity: 60,   # refills: 3  Last Office Visit : 6/1/2020  Future Office visit:  None  Routing refill request to provider for review/approval because:  Last CR, ALT, AST(2017)??    Continue same dose?   Change dose?  Refer to Provider for review    Little Tejeda RN  Central Triage Red Flags/Med Refills      Recent Labs   Lab Test 02/15/17  1354   CR 0.91      Ok to refill medication if creatinine is low         Normal ALT or AST on file in past 26 months         Recent Labs   Lab Test 02/15/17  1354   ALT 19       Recent Labs   Lab Test 02/15/17  1354   AST 24             Little Tejeda RN  Central Triage Red Flags/Med Refills

## 2020-10-02 ENCOUNTER — TELEPHONE (OUTPATIENT)
Dept: SURGERY | Facility: CLINIC | Age: 70
End: 2020-10-02

## 2020-11-09 ENCOUNTER — OFFICE VISIT (OUTPATIENT)
Dept: ENDOCRINOLOGY | Facility: CLINIC | Age: 70
End: 2020-11-09
Payer: COMMERCIAL

## 2020-11-09 VITALS
OXYGEN SATURATION: 100 % | WEIGHT: 192.3 LBS | BODY MASS INDEX: 35.39 KG/M2 | HEIGHT: 62 IN | DIASTOLIC BLOOD PRESSURE: 72 MMHG | HEART RATE: 56 BPM | SYSTOLIC BLOOD PRESSURE: 126 MMHG

## 2020-11-09 DIAGNOSIS — Z98.84 BARIATRIC SURGERY STATUS: Primary | ICD-10-CM

## 2020-11-09 DIAGNOSIS — M25.562 PAIN IN BOTH KNEES, UNSPECIFIED CHRONICITY: ICD-10-CM

## 2020-11-09 DIAGNOSIS — E66.09 NON MORBID OBESITY DUE TO EXCESS CALORIES: ICD-10-CM

## 2020-11-09 DIAGNOSIS — M25.561 PAIN IN BOTH KNEES, UNSPECIFIED CHRONICITY: ICD-10-CM

## 2020-11-09 DIAGNOSIS — K90.9 INTESTINAL MALABSORPTION, UNSPECIFIED TYPE: ICD-10-CM

## 2020-11-09 PROCEDURE — 99214 OFFICE O/P EST MOD 30 MIN: CPT | Performed by: PHYSICIAN ASSISTANT

## 2020-11-09 RX ORDER — DOXEPIN HYDROCHLORIDE 10 MG/1
10 CAPSULE ORAL
COMMUNITY
Start: 2020-03-11

## 2020-11-09 RX ORDER — ASPIRIN 325 MG
325 TABLET ORAL
COMMUNITY
Start: 2020-06-17

## 2020-11-09 RX ORDER — TOPIRAMATE 100 MG/1
100 TABLET, FILM COATED ORAL 2 TIMES DAILY
Qty: 60 TABLET | Refills: 3 | Status: SHIPPED | OUTPATIENT
Start: 2020-11-09 | End: 2022-01-31

## 2020-11-09 RX ORDER — LOSARTAN POTASSIUM 25 MG/1
25 TABLET ORAL
COMMUNITY
Start: 2020-10-15 | End: 2021-10-15

## 2020-11-09 RX ORDER — GABAPENTIN 100 MG/1
100 CAPSULE ORAL
COMMUNITY
Start: 2020-06-13

## 2020-11-09 RX ORDER — MULTIVITAMIN,THER AND MINERALS
1 TABLET ORAL
COMMUNITY
Start: 2020-03-27

## 2020-11-09 RX ORDER — ATORVASTATIN CALCIUM 10 MG/1
TABLET, FILM COATED ORAL
COMMUNITY
Start: 2020-10-13

## 2020-11-09 RX ORDER — BUPROPION HYDROCHLORIDE 150 MG/1
150 TABLET ORAL
COMMUNITY
Start: 2020-07-29 | End: 2021-07-29

## 2020-11-09 ASSESSMENT — MIFFLIN-ST. JEOR: SCORE: 1355.52

## 2020-11-09 ASSESSMENT — PAIN SCALES - GENERAL: PAINLEVEL: NO PAIN (0)

## 2020-11-09 NOTE — PROGRESS NOTES
Return Medical Weight Management Note     Pamella Landa  MRN:  [id number]  :  1950  FRANCESCO:  2020    Dear Cuca Voss MD,    I had the pleasure of seeing your patient Pamella Landa. She is a 70 year old female who I am continuing to see for treatment of obesity related to:       2/15/2017   I have the following health issues associated with obesity: Hypertension (High Blood Pressure), Hyperlipidemia (High Cholesterol), Back Pain   I have the following symptoms associated with obesity: Back Pain       INTERVAL HISTORY:  13 yrs s/p RYGB  Initial Weight: 325 lb  Lowest weight after gastric bypass: 150 lbs stable for many years  Last clinic visit 186 lbs  Last virtual visit no weight at home, no scale.  I increased topiramate to 100mg BID  BMP 1 mo ago normal except slightly high Cl 110    Banana for breakfast with 1 coffee with creamer and brown sugar  Isn't eating as much sweets as in the past  Doesn't eat again until late afternoon/evening then she eats water crackers and adds cream cheese or PB. Drinks bottle water or diet coke.   Increased weight is hurting her leg pain, 160-170 helped her knee pain    She doesn't feel hungry but is slowly gaining weight  Since covid she is less active with walking. She is walking less due to covid.     CURRENT WEIGHT:   192 lbs 4.8 oz    Initial Weight (lbs): 325 lbs  Last Visits Weight: 84.7 kg (186 lb 12.8 oz)  Cumulative weight loss (lbs): 132.7  Weight Loss Percentage: 40.83%  Waist Circumference (cm): 118 cm    Changes and Difficulties 2020   I have made the following changes to my diet since my last visit: On diet but not working   With regards to my diet, I am still struggling with: gaining weight and getting sick   I have made the following changes to my activity/exercise since my last visit: I like to walk   With regards to my activity/exercise, I am still struggling with: knees hurt and legs swelling       VITALS:  /72 (BP Location:  "Left arm, Patient Position: Sitting, Cuff Size: Adult Regular)   Pulse 56   Ht 1.575 m (5' 2\")   Wt 87.2 kg (192 lb 4.8 oz)   SpO2 100%   BMI 35.17 kg/m      MEDICATIONS:   Current Outpatient Medications   Medication Sig Dispense Refill     amLODIPine (NORVASC) 10 MG tablet Take 10 mg by mouth       aspirin (ASA) 325 MG tablet Take 325 mg by mouth       atorvastatin (LIPITOR) 10 MG tablet TK 1 T PO D HS       benzonatate (TESSALON) 100 MG capsule Take 100 mg by mouth       buPROPion (WELLBUTRIN XL) 150 MG 24 hr tablet Take 150 mg by mouth       calcium carb 1250 mg, 500 mg [place],/vitamin D 200 units (OSCAL WITH D) 500-200 MG-UNIT per tablet        cetirizine HCl 10 MG CAPS        Cholecalciferol (VITAMIN D) 2000 UNITS tablet Take 4,000 Units by mouth       cyanocobalamin (VITAMIN B12) 1000 MCG/ML injection Inject 1,000 mcg into the muscle       doxepin (SINEQUAN) 10 MG capsule Take 10 mg by mouth       esomeprazole (NEXIUM) 40 MG CR capsule Take 40 mg by mouth       ferrous fumarate 65 mg, [place]. FE,-Vitamin C 125 mg (VITRON C)  MG TABS tablet Take 1 tablet by mouth 3 times daily 90 tablet 1     fluticasone (FLONASE) 50 MCG/ACT spray 2 sprays       gabapentin (NEURONTIN) 100 MG capsule Take 100 mg by mouth       losartan (COZAAR) 25 MG tablet Take 25 mg by mouth       melatonin 1 MG TABS tablet Take 1 mg by mouth       nystatin (MYCOSTATIN) cream Apply to area under breasts twice daily for one week       pantoprazole (PROTONIX) 20 MG EC tablet Take 20 mg by mouth       senna-docusate (SENOKOT-S;PERICOLACE) 8.6-50 MG per tablet Take 1 tab by mouth twice daily as needed for constipation       SUMAtriptan (IMITREX) 50 MG tablet Take 50 mg by mouth       topiramate (TOPAMAX) 100 MG tablet Take 1 tablet (100 mg) by mouth 2 times daily 60 tablet 3     topiramate (TOPAMAX) 25 MG tablet Take 3 tablets (75 mg) by mouth 2 times daily 180 tablet 5     traZODone (DESYREL) 50 MG tablet 1-2 tablets Qhs  PRN       vitamin "  s/Minerals TABS Take 1 tablet by mouth       acetaminophen (TYLENOL) 500 MG tablet Take 500-1,000 mg by mouth       amitriptyline (ELAVIL) 10 MG tablet Take 10 mg by mouth       metoprolol (TOPROL-XL) 25 MG 24 hr tablet TAKE 1/2 TABLET BY MOUTH EVERY DAY       ondansetron (ZOFRAN-ODT) 4 MG ODT tab I-2 tab BID PRN         Weight Loss Medication History Reviewed With Patient 11/9/2020   Which weight loss medications are you currently taking on a regular basis?  Topamax (topiramate)   Are you having any side effects from the weight loss medication that we have prescribed you? No       ASSESSMENT/PLAN:    MWM follow up. Hx of gastric bypass 13 yrs ago with some weight regain that is affecting her joint pain.   Continue 100 mg twice daily topiramate, refill sent to pharmacy  Focus on more protein in diet vs carbohydrate/sugar in meals, discussed starting with eggs vs banana and coffee with sweetened creamer and brown sugar.  [facility] referral for knee/back pain limiting activity contributing to some of her weight regain.  Bariatric labs today  Refuses dietitian visit    FOLLOW-UP:    12 weeks Amrita chavez return MWM to keep close follow up on weight to avoid more regain    Time: 20 min spent on evaluation, management, counseling, education, & motivational interviewing with greater than 50 % of the total time was spent on counseling and coordinating care    Sincerely,    Amrita Chavez PA-C   room air

## 2020-11-09 NOTE — NURSING NOTE
"Chief Complaint   Patient presents with     RECHECK     Follow up weight management.       Vitals:    11/09/20 0820   BP: 126/72   BP Location: Left arm   Patient Position: Sitting   Cuff Size: Adult Regular   Pulse: 56   SpO2: 100%   Weight: 87.2 kg (192 lb 4.8 oz)   Height: 1.575 m (5' 2\")       Body mass index is 35.17 kg/m .                            FIDENCIO PINEDA, EMT    "

## 2020-11-09 NOTE — LETTER
2020       RE: Pamella Landa  [address]  [place] [address]     Dear Colleague,    Thank you for referring your patient, Pamella Landa, to the [facility] [place] [facility]. Please see a copy of my visit note below.    Return Medical Weight Management Note     Pamella Landa  MRN:  [id number]  :  1950  FRANCESCO:  2020    Dear Cuca Voss MD,    I had the pleasure of seeing your patient Pamella Landa. She is a 70 year old female who I am continuing to see for treatment of obesity related to:       2/15/2017   I have the following health issues associated with obesity: Hypertension (High Blood Pressure), Hyperlipidemia (High Cholesterol), Back Pain   I have the following symptoms associated with obesity: Back Pain       INTERVAL HISTORY:  13 yrs s/p RYGB  Initial Weight: 325 lb  Lowest weight after gastric bypass: 150 lbs stable for many years  Last clinic visit 186 lbs  Last virtual visit no weight at home, no scale.  I increased topiramate to 100mg BID  BMP 1 mo ago normal except slightly high Cl 110    Banana for breakfast with 1 coffee with creamer and brown sugar  Isn't eating as much sweets as in the past  Doesn't eat again until late afternoon/evening then she eats water crackers and adds cream cheese or PB. Drinks bottle water or diet coke.   Increased weight is hurting her leg pain, 160-170 helped her knee pain    She doesn't feel hungry but is slowly gaining weight  Since covid she is less active with walking. She is walking less due to covid.     CURRENT WEIGHT:   192 lbs 4.8 oz    Initial Weight (lbs): 325 lbs  Last Visits Weight: 84.7 kg (186 lb 12.8 oz)  Cumulative weight loss (lbs): 132.7  Weight Loss Percentage: 40.83%  Waist Circumference (cm): 118 cm    Changes and Difficulties 2020   I have made the following changes to my diet since my last visit: On diet but not working  "  With regards to my diet, I am still struggling with: gaining weight and getting sick   I have made the following changes to my activity/exercise since my last visit: I like to walk   With regards to my activity/exercise, I am still struggling with: knees hurt and legs swelling       VITALS:  /72 (BP Location: Left arm, Patient Position: Sitting, Cuff Size: Adult Regular)   Pulse 56   Ht 1.575 m (5' 2\")   Wt 87.2 kg (192 lb 4.8 oz)   SpO2 100%   BMI 35.17 kg/m      MEDICATIONS:   Current Outpatient Medications   Medication Sig Dispense Refill     amLODIPine (NORVASC) 10 MG tablet Take 10 mg by mouth       aspirin (ASA) 325 MG tablet Take 325 mg by mouth       atorvastatin (LIPITOR) 10 MG tablet TK 1 T PO D HS       benzonatate (TESSALON) 100 MG capsule Take 100 mg by mouth       buPROPion (WELLBUTRIN XL) 150 MG 24 hr tablet Take 150 mg by mouth       calcium carb 1250 mg, 500 mg [place],/vitamin D 200 units (OSCAL WITH D) 500-200 MG-UNIT per tablet        cetirizine HCl 10 MG CAPS        Cholecalciferol (VITAMIN D) 2000 UNITS tablet Take 4,000 Units by mouth       cyanocobalamin (VITAMIN B12) 1000 MCG/ML injection Inject 1,000 mcg into the muscle       doxepin (SINEQUAN) 10 MG capsule Take 10 mg by mouth       esomeprazole (NEXIUM) 40 MG CR capsule Take 40 mg by mouth       ferrous fumarate 65 mg, [place]. FE,-Vitamin C 125 mg (VITRON C)  MG TABS tablet Take 1 tablet by mouth 3 times daily 90 tablet 1     fluticasone (FLONASE) 50 MCG/ACT spray 2 sprays       gabapentin (NEURONTIN) 100 MG capsule Take 100 mg by mouth       losartan (COZAAR) 25 MG tablet Take 25 mg by mouth       melatonin 1 MG TABS tablet Take 1 mg by mouth       nystatin (MYCOSTATIN) cream Apply to area under breasts twice daily for one week       pantoprazole (PROTONIX) 20 MG EC tablet Take 20 mg by mouth       senna-docusate (SENOKOT-S;PERICOLACE) 8.6-50 MG per tablet Take 1 tab by mouth twice daily as needed for constipation       " SUMAtriptan (IMITREX) 50 MG tablet Take 50 mg by mouth       topiramate (TOPAMAX) 100 MG tablet Take 1 tablet (100 mg) by mouth 2 times daily 60 tablet 3     topiramate (TOPAMAX) 25 MG tablet Take 3 tablets (75 mg) by mouth 2 times daily 180 tablet 5     traZODone (DESYREL) 50 MG tablet 1-2 tablets Qhs  PRN       vitamin  s/Minerals TABS Take 1 tablet by mouth       acetaminophen (TYLENOL) 500 MG tablet Take 500-1,000 mg by mouth       amitriptyline (ELAVIL) 10 MG tablet Take 10 mg by mouth       metoprolol (TOPROL-XL) 25 MG 24 hr tablet TAKE 1/2 TABLET BY MOUTH EVERY DAY       ondansetron (ZOFRAN-ODT) 4 MG ODT tab I-2 tab BID PRN         Weight Loss Medication History Reviewed With Patient 11/9/2020   Which weight loss medications are you currently taking on a regular basis?  Topamax (topiramate)   Are you having any side effects from the weight loss medication that we have prescribed you? No       ASSESSMENT/PLAN:    MWM follow up. Hx of gastric bypass 13 yrs ago with some weight regain that is affecting her joint pain.   Continue 100 mg twice daily topiramate, refill sent to pharmacy  Focus on more protein in diet vs carbohydrate/sugar in meals, discussed starting with eggs vs banana and coffee with sweetened creamer and brown sugar.  [facility] referral for knee/back pain limiting activity contributing to some of her weight regain.  Bariatric labs today  Refuses dietitian visit    FOLLOW-UP:    12 weeks Amrita chavez return MWM to keep close follow up on weight to avoid more regain    Time: 20 min spent on evaluation, management, counseling, education, & motivational interviewing with greater than 50 % of the total time was spent on counseling and coordinating care    Sincerely,    Amrita Chavez PA-C

## 2020-11-11 DIAGNOSIS — Z98.84 BARIATRIC SURGERY STATUS: ICD-10-CM

## 2020-11-11 DIAGNOSIS — K90.9 INTESTINAL MALABSORPTION, UNSPECIFIED TYPE: ICD-10-CM

## 2020-11-11 LAB
ALBUMIN SERPL-MCNC: 3.6 G/DL (ref 3.4–5)
ALP SERPL-CCNC: 82 U/L (ref 40–150)
ALT SERPL W P-5'-P-CCNC: 30 U/L (ref 0–50)
ANION GAP SERPL CALCULATED.3IONS-SCNC: 4 MMOL/L (ref 3–14)
AST SERPL W P-5'-P-CCNC: 28 U/L (ref 0–45)
BILIRUB SERPL-MCNC: 0.8 MG/DL (ref 0.2–1.3)
BUN SERPL-MCNC: 22 MG/DL (ref 7–30)
CALCIUM SERPL-MCNC: 9 MG/DL (ref 8.5–10.1)
CHLORIDE SERPL-SCNC: 113 MMOL/L (ref 94–109)
CO2 SERPL-SCNC: 24 MMOL/L (ref 20–32)
CREAT SERPL-MCNC: 0.97 MG/DL (ref 0.52–1.04)
DEPRECATED CALCIDIOL+CALCIFEROL SERPL-MC: 33 UG/L (ref 20–75)
ERYTHROCYTE [DISTWIDTH] IN BLOOD BY AUTOMATED COUNT: 14.6 % (ref 10–15)
FERRITIN SERPL-MCNC: 28 NG/ML (ref 8–252)
GFR SERPL CREATININE-BSD FRML MDRD: 60 ML/MIN/{1.73_M2}
GLUCOSE SERPL-MCNC: 88 MG/DL (ref 70–99)
HCT VFR BLD AUTO: 43.8 % (ref 35–47)
HGB BLD-MCNC: 13.5 G/DL (ref 11.7–15.7)
MCH RBC QN AUTO: 27.4 PG (ref 26.5–33)
MCHC RBC AUTO-ENTMCNC: 30.8 G/DL (ref 31.5–36.5)
MCV RBC AUTO: 89 FL (ref 78–100)
PLATELET # BLD AUTO: 223 10E9/L (ref 150–450)
POTASSIUM SERPL-SCNC: 4 MMOL/L (ref 3.4–5.3)
PROT SERPL-MCNC: 6.9 G/DL (ref 6.8–8.8)
PTH-INTACT SERPL-MCNC: 58 PG/ML (ref 18–80)
RBC # BLD AUTO: 4.93 10E12/L (ref 3.8–5.2)
SODIUM SERPL-SCNC: 141 MMOL/L (ref 133–144)
WBC # BLD AUTO: 3.8 10E9/L (ref 4–11)

## 2020-11-11 PROCEDURE — 83970 ASSAY OF PARATHORMONE: CPT | Mod: 90 | Performed by: PATHOLOGY

## 2020-11-11 PROCEDURE — 85027 COMPLETE CBC AUTOMATED: CPT | Performed by: PATHOLOGY

## 2020-11-11 PROCEDURE — 99000 SPECIMEN HANDLING OFFICE-LAB: CPT | Performed by: PATHOLOGY

## 2020-11-11 PROCEDURE — 36415 COLL VENOUS BLD VENIPUNCTURE: CPT | Performed by: PATHOLOGY

## 2020-11-11 PROCEDURE — 80053 COMPREHEN METABOLIC PANEL: CPT | Performed by: PATHOLOGY

## 2020-11-11 PROCEDURE — 82306 VITAMIN D 25 HYDROXY: CPT | Mod: 90 | Performed by: PATHOLOGY

## 2020-11-11 PROCEDURE — 82728 ASSAY OF FERRITIN: CPT | Performed by: PATHOLOGY

## 2020-11-11 PROCEDURE — 84590 ASSAY OF VITAMIN A: CPT | Mod: 90 | Performed by: PATHOLOGY

## 2020-11-14 LAB
ANNOTATION COMMENT IMP: NORMAL
RETINYL PALMITATE SERPL-MCNC: <0.02 MG/L (ref 0–0.1)
VIT A SERPL-MCNC: 0.44 MG/L (ref 0.3–1.2)

## 2021-05-24 ENCOUNTER — RECORDS - HEALTHEAST (OUTPATIENT)
Dept: ADMINISTRATIVE | Facility: CLINIC | Age: 71
End: 2021-05-24

## 2021-05-28 ENCOUNTER — RECORDS - HEALTHEAST (OUTPATIENT)
Dept: ADMINISTRATIVE | Facility: CLINIC | Age: 71
End: 2021-05-28

## 2021-06-02 ENCOUNTER — RECORDS - HEALTHEAST (OUTPATIENT)
Dept: ADMINISTRATIVE | Facility: CLINIC | Age: 71
End: 2021-06-02

## 2021-10-19 ENCOUNTER — VIRTUAL VISIT (OUTPATIENT)
Dept: ENDOCRINOLOGY | Facility: CLINIC | Age: 71
End: 2021-10-19
Payer: COMMERCIAL

## 2021-10-19 VITALS — HEIGHT: 62 IN | BODY MASS INDEX: 36.44 KG/M2 | WEIGHT: 198 LBS

## 2021-10-19 DIAGNOSIS — Z98.84 BARIATRIC SURGERY STATUS: Primary | ICD-10-CM

## 2021-10-19 DIAGNOSIS — E66.812 CLASS 2 SEVERE OBESITY WITH SERIOUS COMORBIDITY AND BODY MASS INDEX (BMI) OF 36.0 TO 36.9 IN ADULT, UNSPECIFIED OBESITY TYPE (H): ICD-10-CM

## 2021-10-19 DIAGNOSIS — E66.01 CLASS 2 SEVERE OBESITY WITH SERIOUS COMORBIDITY AND BODY MASS INDEX (BMI) OF 36.0 TO 36.9 IN ADULT, UNSPECIFIED OBESITY TYPE (H): ICD-10-CM

## 2021-10-19 DIAGNOSIS — K90.9 INTESTINAL MALABSORPTION, UNSPECIFIED TYPE: ICD-10-CM

## 2021-10-19 PROCEDURE — 99443 PR PHYSICIAN TELEPHONE EVALUATION 21-30 MIN: CPT | Mod: TEL | Performed by: INTERNAL MEDICINE

## 2021-10-19 ASSESSMENT — MIFFLIN-ST. JEOR: SCORE: 1376.37

## 2021-10-19 NOTE — PATIENT INSTRUCTIONS
"(sent via Letter, pt. not on My Chart yet)    Nice to talk with you today in virtual clinic    See your primary care physician within 1-2 weeks of discharge from [facility] as you may be due to labs, medication review, etc.    Food goal: 1,000 (REE 1,483) calorie food plan using meal replacements such as Lean Cuisines, Healthy Choice, Smart Ones, Weight Watchers and supplement with fresh fruits and veggies and keep a daily food journal  Ht 1.575 m (5' 2\")   Wt 89.8 kg (198 lb)   BMI 36.21 kg/m      Activity goal: Start walking program daily as able, You may want to purchase a reclining exercise bike for home use to limit impact on weight bearing joints to prevent pain and allow you to do cardio fitness to reduce your risk of death due to cardiovascular disease and to promote weight loss.    Weight goal: weigh self 2x weekly and lose 1-2 lbs weekly    Medication goal: Please continue to hold topiramate until you see Dr. Karol Sweeney, Pharm D re: medication mgmt. Some of your medications may have drug-drug interactions.    RTC: quarterly or as needed     Please use [facility] to schedule your appointment(s) or call [phone] to schedule in [facility]      Best Wishes,  Dr. Makenzie Dumont MD, MPH  Endocrinologist    "

## 2021-10-19 NOTE — PROGRESS NOTES
"Pamella is a 71 year old who is being evaluated via a billable telephone visit.      What phone number would you like to be contacted at? [phone]  How would you like to obtain your AVS? MyChart    Return Medical Weight Management Note     Pamella Landa  MRN:  [id number]  :  1950  FRANCESCO:  10/19/2021    Dear Colleagues,    I had the pleasure of seeing your patient Pamella Landa. She is a 71 year old female who I am continuing to see for treatment of obesity related to:       2/15/2017   I have the following health issues associated with obesity: Hypertension (High Blood Pressure), Hyperlipidemia (High Cholesterol), Back Pain   I have the following symptoms associated with obesity: Back Pain       INTERVAL HISTORY:  1st time I am seeing this patient, kaden maldonado/ Amrita Adair on 2020  13 yrs s/p RYGB  Initial Weight: 325 lb  Lowest weight after gastric bypass: 150 lbs stable for many years  Was on topiramate to 100mg BID . . .     Admission 10/13/21, d/c'd to TCU:  Acute toxic encephalopathy  Unintentional overdose  Polypharmacy  Chronic pain syndrome  Constipation  Sinus bradycardia  HTN  Right great toe pain  \"As outpatient, gabapentin dose recently increased and baclofen was also started. She also takes trazodone, cetirizine, doxepin, and topiramate.  shows no opioid or benzo prescriptions. Work-up negative for CVA, cardiac, metabolic, infectious causes. Mental status drastically improved with medication washout.  - Holding sedating medications at discharge (baclofen, doxepin, gabapentin, topiramate, trazodone). Discussed with PharmD; baclofen is new enough medication that we should not have to worry about withdrawal.  - Messaged PCP Dr. Marina to have close follow-up as anticipate may have worsening of chronic pain off medications.\"    Is at TCU now and they are providing diet. She is going home today.    CURRENT WEIGHT:   198 lbs 0 oz    Initial Weight (lbs): 325 lbs  Last Visits Weight: " "87.2 kg (192 lb 4.8 oz)  Cumulative weight loss (lbs): 127  Weight Loss Percentage: 39.08%    Changes and Difficulties 10/19/2021   I have made the following changes to my diet since my last visit: No changes.   With regards to my diet, I am still struggling with: Eating 1.5 meals and watching what she eats but still gaining weight   I have made the following changes to my activity/exercise since my last visit: No changes.   With regards to my activity/exercise, I am still struggling with: Trouble breathing when walking.       VITALS:  Ht 1.575 m (5' 2\")   Wt 89.8 kg (198 lb)   BMI 36.21 kg/m       EXAM:  There were no vitals taken for this virtual visit  Gen: calm, nad, pleasant and conversant  Neuro: A&O    LABS:  Creatinine   Date Value Ref Range Status   11/11/2020 0.97 0.52 - 1.04 mg/dL Final     Lab Results   Component Value Date    A1C 5.6 02/15/2017    A1C 5.4 08/13/2014     TSH   Date Value Ref Range Status   12/18/2007 1.25 0.4 - 5.0 mU/L Final       MEDICATIONS: MEDICATIONS IN THIS CHART MAY NOT BE ACCURATE (patient is in a TCU at time of this visit).    Current Outpatient Medications   Medication Sig Dispense Refill     acetaminophen (TYLENOL) 500 MG tablet Take 500-1,000 mg by mouth       amitriptyline (ELAVIL) 10 MG tablet Take 10 mg by mouth       amLODIPine (NORVASC) 10 MG tablet Take 10 mg by mouth       aspirin (ASA) 325 MG tablet Take 325 mg by mouth       atorvastatin (LIPITOR) 10 MG tablet TK 1 T PO D HS       benzonatate (TESSALON) 100 MG capsule Take 100 mg by mouth       buPROPion (WELLBUTRIN XL) 150 MG 24 hr tablet Take 150 mg by mouth       calcium carb 1250 mg, 500 mg [place],/vitamin D 200 units (OSCAL WITH D) 500-200 MG-UNIT per tablet        cetirizine HCl 10 MG CAPS        Cholecalciferol (VITAMIN D) 2000 UNITS tablet Take 4,000 Units by mouth       cyanocobalamin (VITAMIN B12) 1000 MCG/ML injection Inject 1,000 mcg into the muscle       doxepin (SINEQUAN) 10 MG capsule Take 10 mg by " mouth       esomeprazole (NEXIUM) 40 MG CR capsule Take 40 mg by mouth       ferrous fumarate 65 mg, [place]. FE,-Vitamin C 125 mg (VITRON C)  MG TABS tablet Take 1 tablet by mouth 3 times daily 90 tablet 1     fluticasone (FLONASE) 50 MCG/ACT spray 2 sprays       gabapentin (NEURONTIN) 100 MG capsule Take 100 mg by mouth       losartan (COZAAR) 25 MG tablet Take 25 mg by mouth       melatonin 1 MG TABS tablet Take 1 mg by mouth       metoprolol (TOPROL-XL) 25 MG 24 hr tablet TAKE 1/2 TABLET BY MOUTH EVERY DAY       nystatin (MYCOSTATIN) cream Apply to area under breasts twice daily for one week       ondansetron (ZOFRAN-ODT) 4 MG ODT tab I-2 tab BID PRN       pantoprazole (PROTONIX) 20 MG EC tablet Take 20 mg by mouth       senna-docusate (SENOKOT-S;PERICOLACE) 8.6-50 MG per tablet Take 1 tab by mouth twice daily as needed for constipation       SUMAtriptan (IMITREX) 50 MG tablet Take 50 mg by mouth       topiramate (TOPAMAX) 100 MG tablet Take 1 tablet (100 mg) by mouth 2 times daily 60 tablet 3     traZODone (DESYREL) 50 MG tablet 1-2 tablets Qhs  PRN       vitamin  s/Minerals TABS Take 1 tablet by mouth         Weight Loss Medication History Reviewed With Patient 10/19/2021   Which weight loss medications are you currently taking on a regular basis?  None   Are you having any side effects from the weight loss medication that we have prescribed you? No       ASSESSMENT:   Obesity, recent admission for unintentional medication overdose, topiramate & other meds on hold 2/2 that admission, continue to hold topiramate until she sees Pharm D for [facility] and primary care MD  Patient has a scale now and can weigh self & is able to make changes to diet since is being discharged from TCU today  Body mass index is 36.21 kg/m .    PLAN:     Patient Instructions:    (sent via Letter, pt. not on My Chart yet)    Nice to talk with you today in virtual clinic    See your primary care physician within 1-2 weeks of discharge from  "TCU as you may be due to labs, medication review, etc.    Food goal: 1,000 (REE 1,483) calorie food plan using meal replacements such as Lean Cuisines, Healthy Choice, Smart Ones, Weight Watchers and supplement with fresh fruits and veggies and keep a daily food journal  Ht 1.575 m (5' 2\")   Wt 89.8 kg (198 lb)   BMI 36.21 kg/m      Activity goal: Start walking program daily as able, You may want to purchase a reclining exercise bike for home use to limit impact on weight bearing joints to prevent pain and allow you to do cardio fitness to reduce your risk of death due to cardiovascular disease and to promote weight loss.    Weight goal: weigh self 2x weekly and lose 1-2 lbs weekly    Medication goal: Please continue to hold topiramate until you see Dr. Karol Sweeney, Pharm D re: medication mgmt. Some of your medications may have drug-drug interactions.    RTC: quarterly or as needed     Please use "[employer] to schedule your appointment(s) or call [phone] to schedule in [facility]      Dr. Makenzie Mckeon MD, MPH  Endocrinologist      Thad Zepeda, EMT  Surgery Clinic    Phone call duration: 7 minutes    Total Time: 25 min spent on chart review, evaluation, management, counseling, education, & motivational interviewing on the day of encounter    "

## 2021-10-19 NOTE — LETTER
"Patient:  Pamella MUNOZ Cordell  :   1950  MRN:     [id number]      Ms.Florence MUNOZ Cyndyutkymberly  [address] [place] [address]      2021    Dear Ms. Cordell,    Nice to talk with you today in virtual clinic    See your primary care physician within 1-2 weeks of discharge from TCU as you may be due to labs, medication review, etc.    Food goal: 1,000 (REE 1,483) calorie food plan using meal replacements such as Lean Cuisines, Healthy Choice, Smart Ones, Weight Watchers and supplement with fresh fruits and veggies and keep a daily food journal  Ht 1.575 m (5' 2\")   Wt 89.8 kg (198 lb)   BMI 36.21 kg/m      Activity goal: Start walking program daily as able, You may want to purchase a reclining exercise bike for home use to limit impact on weight bearing joints to prevent pain and allow you to do cardio fitness to reduce your risk of death due to cardiovascular disease and to promote weight loss.    Weight goal: weigh self 2x weekly and lose 1-2 lbs weekly    Medication goal: Please continue to hold topiramate until you see Dr. Karol Sweeney, Pharm D re: medication mgmt. Some of your medications may have drug-drug interactions.    RTC: quarterly or as needed     Please use [employer] to schedule your appointment(s) or call [phone] to schedule in [facility]      Best Wishes,  Dr. Makenzie Dumont MD, MPH  Endocrinologist          "

## 2021-10-19 NOTE — NURSING NOTE
Chief Complaint   Patient presents with     Follow Up     Return medical weight management.       Vitals:    10/19/21 1320   Weight: 198 lb       Body mass index is 36.21 kg/m .      Thad Zepeda, EMT  Surgery Clinic

## 2022-01-31 ENCOUNTER — OFFICE VISIT (OUTPATIENT)
Dept: ENDOCRINOLOGY | Facility: CLINIC | Age: 72
End: 2022-01-31
Payer: COMMERCIAL

## 2022-01-31 ENCOUNTER — LAB (OUTPATIENT)
Dept: LAB | Facility: CLINIC | Age: 72
End: 2022-01-31
Payer: COMMERCIAL

## 2022-01-31 VITALS
OXYGEN SATURATION: 100 % | BODY MASS INDEX: 36.91 KG/M2 | DIASTOLIC BLOOD PRESSURE: 68 MMHG | HEIGHT: 62 IN | SYSTOLIC BLOOD PRESSURE: 118 MMHG | HEART RATE: 60 BPM | WEIGHT: 200.6 LBS

## 2022-01-31 DIAGNOSIS — E66.01 CLASS 2 SEVERE OBESITY WITH SERIOUS COMORBIDITY AND BODY MASS INDEX (BMI) OF 36.0 TO 36.9 IN ADULT, UNSPECIFIED OBESITY TYPE (H): ICD-10-CM

## 2022-01-31 DIAGNOSIS — K90.9 INTESTINAL MALABSORPTION, UNSPECIFIED TYPE: Primary | ICD-10-CM

## 2022-01-31 DIAGNOSIS — K90.9 INTESTINAL MALABSORPTION, UNSPECIFIED TYPE: ICD-10-CM

## 2022-01-31 DIAGNOSIS — Z98.84 BARIATRIC SURGERY STATUS: ICD-10-CM

## 2022-01-31 DIAGNOSIS — E66.812 CLASS 2 SEVERE OBESITY WITH SERIOUS COMORBIDITY AND BODY MASS INDEX (BMI) OF 36.0 TO 36.9 IN ADULT, UNSPECIFIED OBESITY TYPE (H): ICD-10-CM

## 2022-01-31 LAB
ALBUMIN SERPL-MCNC: 3.6 G/DL (ref 3.4–5)
ALP SERPL-CCNC: 82 U/L (ref 40–150)
ALT SERPL W P-5'-P-CCNC: 26 U/L (ref 0–50)
ANION GAP SERPL CALCULATED.3IONS-SCNC: <1 MMOL/L (ref 3–14)
AST SERPL W P-5'-P-CCNC: 32 U/L (ref 0–45)
BILIRUB SERPL-MCNC: 0.8 MG/DL (ref 0.2–1.3)
BUN SERPL-MCNC: 16 MG/DL (ref 7–30)
CALCIUM SERPL-MCNC: 8.9 MG/DL (ref 8.5–10.1)
CHLORIDE BLD-SCNC: 112 MMOL/L (ref 94–109)
CO2 SERPL-SCNC: 25 MMOL/L (ref 20–32)
CREAT SERPL-MCNC: 0.89 MG/DL (ref 0.52–1.04)
DEPRECATED CALCIDIOL+CALCIFEROL SERPL-MC: 28 UG/L (ref 20–75)
ERYTHROCYTE [DISTWIDTH] IN BLOOD BY AUTOMATED COUNT: 14.8 % (ref 10–15)
FERRITIN SERPL-MCNC: 26 NG/ML (ref 8–252)
GFR SERPL CREATININE-BSD FRML MDRD: 70 ML/MIN/1.73M2
GLUCOSE BLD-MCNC: 87 MG/DL (ref 70–99)
HCT VFR BLD AUTO: 41.6 % (ref 35–47)
HGB BLD-MCNC: 13 G/DL (ref 11.7–15.7)
MCH RBC QN AUTO: 26.7 PG (ref 26.5–33)
MCHC RBC AUTO-ENTMCNC: 31.3 G/DL (ref 31.5–36.5)
MCV RBC AUTO: 86 FL (ref 78–100)
PLATELET # BLD AUTO: 205 10E3/UL (ref 150–450)
POTASSIUM BLD-SCNC: 4.1 MMOL/L (ref 3.4–5.3)
PROT SERPL-MCNC: 6.8 G/DL (ref 6.8–8.8)
PTH-INTACT SERPL-MCNC: 110 PG/ML (ref 18–80)
RBC # BLD AUTO: 4.86 10E6/UL (ref 3.8–5.2)
SODIUM SERPL-SCNC: 136 MMOL/L (ref 133–144)
VIT B12 SERPL-MCNC: 1515 PG/ML (ref 193–986)
WBC # BLD AUTO: 3.7 10E3/UL (ref 4–11)

## 2022-01-31 PROCEDURE — 84590 ASSAY OF VITAMIN A: CPT | Mod: 90 | Performed by: PATHOLOGY

## 2022-01-31 PROCEDURE — 82306 VITAMIN D 25 HYDROXY: CPT | Mod: 90 | Performed by: PATHOLOGY

## 2022-01-31 PROCEDURE — 85027 COMPLETE CBC AUTOMATED: CPT | Performed by: PATHOLOGY

## 2022-01-31 PROCEDURE — 36415 COLL VENOUS BLD VENIPUNCTURE: CPT | Performed by: PATHOLOGY

## 2022-01-31 PROCEDURE — 80053 COMPREHEN METABOLIC PANEL: CPT | Performed by: PATHOLOGY

## 2022-01-31 PROCEDURE — 82728 ASSAY OF FERRITIN: CPT | Performed by: PATHOLOGY

## 2022-01-31 PROCEDURE — 82607 VITAMIN B-12: CPT | Performed by: PATHOLOGY

## 2022-01-31 PROCEDURE — 83970 ASSAY OF PARATHORMONE: CPT | Mod: 90 | Performed by: PATHOLOGY

## 2022-01-31 PROCEDURE — 99213 OFFICE O/P EST LOW 20 MIN: CPT | Mod: GT | Performed by: PHYSICIAN ASSISTANT

## 2022-01-31 PROCEDURE — 99000 SPECIMEN HANDLING OFFICE-LAB: CPT | Performed by: PATHOLOGY

## 2022-01-31 RX ORDER — TOPIRAMATE 25 MG/1
TABLET, FILM COATED ORAL
Qty: 90 TABLET | Refills: 1 | Status: SHIPPED | OUTPATIENT
Start: 2022-01-31 | End: 2022-03-29

## 2022-01-31 ASSESSMENT — PAIN SCALES - GENERAL: PAINLEVEL: NO PAIN (0)

## 2022-01-31 ASSESSMENT — MIFFLIN-ST. JEOR: SCORE: 1388.17

## 2022-01-31 NOTE — PROGRESS NOTES
"Pamella Landa is a 71 year old female who is being evaluated via a billable telephone visit.     The patient has been notified of following:     \"This telephone visit will be conducted via a call between you and your physician/provider. We have found that certain health care needs can be provided without the need for a physical exam.  This service lets us provide the care you need with a short phone conversation.  If a prescription is necessary we can send it directly to your pharmacy.  If lab work is needed we can place an order for that and you can then stop by our lab to have the test done at a later time.    Telephone visits are billed at different rates depending on your insurance coverage. During this emergency period, for some insurers they may be billed the same as an in-person visit.  Please reach out to your insurance provider with any questions.    If during the course of the call the physician/provider feels a telephone visit is not appropriate, you will not be charged for this service.\"    Patient has given verbal consent for Telephone visit?  Yes    How would you like to obtain your AVS? Mail    Phone call duration: 27 minutes    During this virtual visit the patient is located in [place], patient verifies this as the location during the entirety of this visit.     Nutrition Assessment  Reason For Visit:  Pamella Landa is a 71 year old female presents today for new re-establish nutrition visit. Pt with history of RNYGB 15 years ago.  Patient referred by CHEO Key on January 31, 2022.    Patient with Co-morbidities of obesity including:  Type II DM no  Renal Failure no  Sleep apnea no  Hypertension yes   Dyslipidemia yes  Joint pain no  Back pain yes  GERD no    Anthropometrics:  Pre-op Weight: 325 lbs  Current Weight:   Estimated body mass index is 36.69 kg/m  as calculated from the following:    Height as of 1/31/22: 1.575 m (5' 2\").    Weight as of 1/31/22: 91 kg (200 lb 9.6 oz).  Weight " loss: 125 lbs    Current Vitamins/Minerals: MVI/minerals once daily, Calcium Citrate + Vitamin D (500 mg once per day), Vitamin B12 (1000 mcg/day), Vitamin D3 (5000 units/day), Vitron C (just ran out)    1/31/21 labs: B12: 1515 (H), Vitamin D: 28     Nutrition History:  Met with PA on 1/31, plan - Protein powder options given to patient today.  Need to minimize fruit and add protein to morning smoothie. Restarting topiramate.     Today she reports she is seldom eating snacks. Reports usually only having 1-2 meals per day. Small meals.   Does not tolerate milk. Cheese ok.   No Dumping syndrome. No issues with low BG.    She is working on weight loss after gradual weight regain.     Recent food recall:  Breakfast: smoothie (banana, apple, etc + apple/OJ juice; skip  Lunch: 1-2 pm a few bites rice/plantains/cassava   Dinner: skip  Snacks: none usually. Occ cheese, crackers and jelly/PB  Beverages: 1-2 cans diet soda, water (32-48 oz/day), 1 cup decaf coffee + brown sugar + powdered creamer      Dining out: none     Physical Activity:  Limited by back pain, cannot stand or sit for too long. Prior loved walking. She is doing PT for her back. Considering back pain.     Nutrition Prescription:  Grams Protein: 50-60 (minimum)  Amount of Fluid: 48-64 oz    Nutrition Diagnosis  Obesity r/t long history of positive energy balance aeb BMI >30.    Intervention  Materials/Education provided on maintenance dietary guidelines after bariatric surgery, portion sizes, eating pace and chewing well, snacking, separation of beverages from meals, vitamin and mineral supplements after weight loss surgery, and protein needs. Discussed ways to optimize protein intake and discussed high-protein meal ideas. Reviewed high B12 and borderline low vitamin D labs with pt, encouraged she decrease B12 supplement, and increase calcium and MVI to meet guidelines post-RNYGB to help increase vitamin D levels.  Patient demonstrates understanding. Provided  pt with list of goals and RD contact information.    Expected Engagement: good    Goals:  1) Increase protein intake. Aim for 60 gm /day.   - Eat 2-3 small meals per day. Eat protein foods first at your meal.  2) Relating to dietary supplements:   - Take 2 multivitamins per day   - Take 3 calcium supplements per day (separate from each other and iron supplement)   - Reduce B12 supplement to 500 mcg/day    Protein smoothie: <1 cup fruit + 1 serv protein powder + water  Meal/snack ideas:   - whole grain cracker with chicken salad  - 1 slice whole grain, sprouted bread with 2 oz salmon and low-fat cristobal  - 3 oz chicken + 1/4 cup rice/cassava/plantain    Keeping Up Your Diet after Weight Loss Surgery  [email or web address]      Follow-Up:  1 month, PRN    Time spent with patient: 27 minutes.  Hailey Ojeda RD, LD

## 2022-01-31 NOTE — NURSING NOTE
"Chief Complaint   Patient presents with     RECHECK     Follow-up Weight Management       Vitals:    01/31/22 0832   BP: 118/68   BP Location: Left arm   Patient Position: Chair   Cuff Size: Adult Large   Pulse: 60   SpO2: 100%   Weight: 91 kg (200 lb 9.6 oz)   Height: 1.575 m (5' 2\")       Body mass index is 36.69 kg/m .                          "

## 2022-01-31 NOTE — LETTER
2022       RE: Pamella Landa  [address] [place] [address]     Dear Colleague,    Thank you for referring your patient, Pamella Landa, to the [facility] [place] at [facility]. Please see a copy of my visit note below.      15 minutes spent on the date of the encounter doing chart review, history and exam, documentation and further activities per the note    Return Medical Weight Management Note     Pamella Landa  MRN:  [id number]  :  1950  FRANCESCO:  2022    Dear Cuca Voss MD,    I had the pleasure of seeing your patient Pamella Landa. She is a 71 year old female who I am continuing to see for treatment of obesity related to:       2/15/2017   I have the following health issues associated with obesity: Hypertension (High Blood Pressure), Hyperlipidemia (High Cholesterol), Back Pain   I have the following symptoms associated with obesity: Back Pain       Assessment & Plan   Problem List Items Addressed This Visit     Bariatric surgery status    Relevant Orders    Ferritin    Comprehensive metabolic panel    CBC with platelets    Vitamin A    Vitamin B12    Vitamin D Deficiency    Parathyroid Hormone Intact    Malabsorption - Primary    Relevant Orders    Ferritin    Comprehensive metabolic panel    CBC with platelets    Vitamin A    Vitamin B12    Vitamin D Deficiency    Parathyroid Hormone Intact         INTERVAL HISTORY:  15 years s/p RYGB  Initial weight: 325 lbs  Lowest after RYGB 150 lbs, stable for many years  Last clinic visit 20 and weight was 192 lbs, weight today 200 lbs  Due for labs.  Hospitalized in 2021, after starting baclofen possible medication reaction.   Was told to hold multiple medications due to polypharmacy including topiramate  She self restarted topiramate for 1-2 months and tolerated it well at 100mg BID but 3 weeks ago stopped ago per PCP in order to  wait until today's visit to discuss restarting it    Most days she eats homemade smoothie for breakfast and then one meal late afternoon.  Has not seen RD recently    PLAN:    Protein powder options given to patient today.  Need to minimize fruit and add protein to morning smoothie  Restart topiramate ramp to 75mg  Bariatric labs first floor today  Need visit with RD soon  Follow up 1 month phone visit with MTM pharmacist  Follow up 3 months return BLANK Crowe video or in person             CURRENT WEIGHT:   200 lbs 9.6 oz    Initial Weight (lbs): 325 lbs  Last Visits Weight: 87.2 kg (192 lb 3.9 oz)  Cumulative weight loss (lbs): 124.4  Weight Loss Percentage: 38.28%  Waist Circumference (cm): 117 cm    Changes and Difficulties 1/31/2022   I have made the following changes to my diet since my last visit: no   With regards to my diet, I am still struggling with: no   I have made the following changes to my activity/exercise since my last visit: no   With regards to my activity/exercise, I am still struggling with: yes exercise 2x week back pain         MEDICATIONS:   Current Outpatient Medications   Medication Sig Dispense Refill     acetaminophen (TYLENOL) 500 MG tablet Take 500-1,000 mg by mouth       amLODIPine (NORVASC) 10 MG tablet Take 10 mg by mouth       aspirin (ASA) 325 MG tablet Take 325 mg by mouth       atorvastatin (LIPITOR) 10 MG tablet TK 1 T PO D HS       benzonatate (TESSALON) 100 MG capsule Take 100 mg by mouth       calcium carb 1250 mg, 500 mg [place],/vitamin D 200 units (OSCAL WITH D) 500-200 MG-UNIT per tablet        cetirizine HCl 10 MG CAPS        Cholecalciferol (VITAMIN D) 2000 UNITS tablet Take 4,000 Units by mouth       cyanocobalamin (VITAMIN B12) 1000 MCG/ML injection Inject 1,000 mcg into the muscle       doxepin (SINEQUAN) 10 MG capsule Take 10 mg by mouth       esomeprazole (NEXIUM) 40 MG CR capsule Take 40 mg by mouth       ferrous fumarate 65 mg, [place]. FE,-Vitamin C 125 mg (VITRON C)   MG TABS tablet Take 1 tablet by mouth 3 times daily 90 tablet 1     fluticasone (FLONASE) 50 MCG/ACT spray 2 sprays       gabapentin (NEURONTIN) 100 MG capsule Take 100 mg by mouth       melatonin 1 MG TABS tablet Take 1 mg by mouth       metoprolol (TOPROL-XL) 25 MG 24 hr tablet TAKE 1/2 TABLET BY MOUTH EVERY DAY       nystatin (MYCOSTATIN) cream Apply to area under breasts twice daily for one week       ondansetron (ZOFRAN-ODT) 4 MG ODT tab I-2 tab BID PRN       pantoprazole (PROTONIX) 20 MG EC tablet Take 20 mg by mouth       senna-docusate (SENOKOT-S;PERICOLACE) 8.6-50 MG per tablet Take 1 tab by mouth twice daily as needed for constipation       SUMAtriptan (IMITREX) 50 MG tablet Take 50 mg by mouth       topiramate (TOPAMAX) 100 MG tablet Take 1 tablet (100 mg) by mouth 2 times daily 60 tablet 3     traZODone (DESYREL) 50 MG tablet 1-2 tablets Qhs  PRN       vitamin  s/Minerals TABS Take 1 tablet by mouth       amitriptyline (ELAVIL) 10 MG tablet Take 10 mg by mouth       buPROPion (WELLBUTRIN XL) 150 MG 24 hr tablet Take 150 mg by mouth       losartan (COZAAR) 25 MG tablet Take 25 mg by mouth         Weight Loss Medication History Reviewed With Patient 1/31/2022   Which weight loss medications are you currently taking on a regular basis?  Topamax (topiramate)   Are you having any side effects from the weight loss medication that we have prescribed you? No   If you are having side effects please describe: none       Orders Only on 11/11/2020   Component Date Value Ref Range Status     Sodium 11/11/2020 141  133 - 144 mmol/L Final     Potassium 11/11/2020 4.0  3.4 - 5.3 mmol/L Final     Chloride 11/11/2020 113* 94 - 109 mmol/L Final     Carbon Dioxide 11/11/2020 24  20 - 32 mmol/L Final     Anion Gap 11/11/2020 4  3 - 14 mmol/L Final     Glucose 11/11/2020 88  70 - 99 mg/dL Final     Urea Nitrogen 11/11/2020 22  7 - 30 mg/dL Final     Creatinine 11/11/2020 0.97  0.52 - 1.04 mg/dL Final     GFR Estimate  11/11/2020 60* >60 mL/min/[1.73_m2] Final    Comment: Non  GFR Calc  Starting 12/18/2018, serum creatinine based estimated GFR (eGFR) will be   calculated using the Chronic Kidney Disease Epidemiology Collaboration   (CKD-EPI) equation.       GFR Estimate If Black 11/11/2020 70  >60 mL/min/[1.73_m2] Final    Comment:  GFR Calc  Starting 12/18/2018, serum creatinine based estimated GFR (eGFR) will be   calculated using the Chronic Kidney Disease Epidemiology Collaboration   (CKD-EPI) equation.       Calcium 11/11/2020 9.0  8.5 - 10.1 mg/dL Final     Bilirubin Total 11/11/2020 0.8  0.2 - 1.3 mg/dL Final     Albumin 11/11/2020 3.6  3.4 - 5.0 g/dL Final     Protein Total 11/11/2020 6.9  6.8 - 8.8 g/dL Final     Alkaline Phosphatase 11/11/2020 82  40 - 150 U/L Final     ALT 11/11/2020 30  0 - 50 U/L Final     AST 11/11/2020 28  0 - 45 U/L Final     WBC 11/11/2020 3.8* 4.0 - 11.0 10e9/L Final     RBC Count 11/11/2020 4.93  3.8 - 5.2 10e12/L Final     Hemoglobin 11/11/2020 13.5  11.7 - 15.7 g/dL Final     Hematocrit 11/11/2020 43.8  35.0 - 47.0 % Final     MCV 11/11/2020 89  78 - 100 fl Final     MCH 11/11/2020 27.4  26.5 - 33.0 pg Final     MCHC 11/11/2020 30.8* 31.5 - 36.5 g/dL Final     RDW 11/11/2020 14.6  10.0 - 15.0 % Final     Platelet Count 11/11/2020 223  150 - 450 10e9/L Final     Ferritin 11/11/2020 28  8 - 252 ng/mL Final     Parathyroid Hormone Intact 11/11/2020 58  18 - 80 pg/mL Final     Vitamin A 11/11/2020 0.44  0.30 - 1.20 mg/L Final     Retinol Palmitate 11/11/2020 <0.02  0.00 - 0.10 mg/L Final     Vitamin A Interp 11/11/2020 Normal   Final    Comment: (Note)  Test developed and characteristics determined by [facility]. See Compliance Statement B: [email or web address]  Performed By: [facility]  [address] [place] [id number]  : Rimma Horner MD       Vitamin D Deficiency screening 11/11/2020 33  20 - 75 ug/L Final    Comment:  "Season, race, dietary intake, and treatment affect the concentration of   25-hydroxy-Vitamin D. Values may decrease during winter months and increase   during summer months. Values 20-29 ug/L may indicate Vitamin D insufficiency   and values <20 ug/L may indicate Vitamin D deficiency.  Vitamin D determination is routinely performed by an immunoassay specific for   25 hydroxyvitamin D3.  If an individual is on vitamin D2 (ergocalciferol)   supplementation, please specify 25 [place] vitamin D2 and D3 level determination by   LCMSMS test [id number].           PHYSICAL EXAM:  Objective    /68 (BP Location: Left arm, Patient Position: Chair, Cuff Size: Adult Large)   Pulse 60   Ht 1.575 m (5' 2\")   Wt 91 kg (200 lb 9.6 oz)   SpO2 100%   BMI 36.69 kg/m      /68 (BP Location: Left arm, Patient Position: Chair, Cuff Size: Adult Large)   Pulse 60   Ht 1.575 m (5' 2\")   Wt 91 kg (200 lb 9.6 oz)   SpO2 100%   BMI 36.69 kg/m    Body mass index is 36.69 kg/m .  GENERAL: Healthy, alert and no distress  EYES: Eyes grossly normal to inspection.  No discharge or erythema, or obvious scleral/conjunctival abnormalities.  RESP: No audible wheeze, cough, or visible cyanosis.  No visible retractions or increased work of breathing.    SKIN: Visible skin clear. No significant rash, abnormal pigmentation or lesions.  NEURO: Cranial nerves grossly intact.  Mentation and speech appropriate for age.  PSYCH: Mentation appears normal, affect normal/bright, judgement and insight intact, normal speech and appearance well-groomed.        Sincerely,    Amrita Adair PA-C      "

## 2022-02-03 ENCOUNTER — VIRTUAL VISIT (OUTPATIENT)
Dept: ENDOCRINOLOGY | Facility: CLINIC | Age: 72
End: 2022-02-03
Payer: COMMERCIAL

## 2022-02-03 DIAGNOSIS — E66.9 OBESITY: ICD-10-CM

## 2022-02-03 DIAGNOSIS — Z71.3 NUTRITIONAL COUNSELING: Primary | ICD-10-CM

## 2022-02-03 DIAGNOSIS — Z98.84 BARIATRIC SURGERY STATUS: ICD-10-CM

## 2022-02-03 LAB
ANNOTATION COMMENT IMP: NORMAL
RETINYL PALMITATE SERPL-MCNC: <0.02 MG/L
VIT A SERPL-MCNC: 0.54 MG/L

## 2022-02-03 PROCEDURE — 97802 MEDICAL NUTRITION INDIV IN: CPT | Performed by: DIETITIAN, REGISTERED

## 2022-02-03 NOTE — PATIENT INSTRUCTIONS
Viraj Can,     Follow-up with RD in 1 month.     Thank you,    Hailey Ojeda, RD, LD  If you would like to schedule or reschedule an appointment with the RD, please call [phone]    Nutrition Goals  1) Increase protein intake. Aim for 60 gm /day.   - Eat 2-3 small meals per day. Eat protein foods first at your meal.  2) Relating to dietary supplements:   - Take 2 multivitamins per day   - Take 3 calcium supplements per day (separate from each other and iron supplement)   - Reduce B12 supplement to 500 mcg/day    Protein smoothie: <1 cup fruit + 1 serv protein powder + water  Meal/snack ideas:   - whole grain cracker with chicken salad  - 1 slice whole grain, sprouted bread with 2 oz salmon and low-fat cristobal  - 3 oz chicken + 1/4 cup rice/cassava/plantain    Keeping Up Your Diet after Weight Loss Surgery  [email or web address]    Interested in working with a health ?  Health coaches work with you to improve your overall health and wellbeing.  They look at the whole person, and may involve discussion of different areas of life, including, but not limited to the four pillars of health (sleep, exercise, nutrition, and stress management). Discuss with your care team if you would like to start working a health .    Health Coaching-3 Pack:    $99 for three health coaching visits    Visits may be done in person or via phone    Coaching is a partnership between the  and the client; Coaches do not prescribe or diagnose    Coaching helps inspire the client to reach his/her personal goals      COMPREHENSIVE WEIGHT MANAGEMENT PROGRAM  VIRTUAL SUPPORT GROUPS    For Support Group Information:      We offer support groups for patients who are working on weight loss and considering, preparing for or have had weight loss surgery.   There is no cost for this opportunity.  You are invited to attend the?Virtual Support Groups?provided by any of the following locations:    1. [place] via [employer]  "with Pam Kidd RN  2.   [place] via [employer] Teams with Steven Thompson, PhD, LP  3.   [place] via [employer] with Kimberly Day RN  4.   [facility] via [employer] Teams with Kimberly Gregorio [facility]    The following Support Group information can also be found on our website:  [email or web address]      [facility] Weight Loss Surgery Support Group    [facility] Weight Loss Surgery Support Group  The support group is a patient-lead forum that meets monthly to share experiences, encouragement and education. It is open to those who have had weight loss surgery, are scheduled for surgery, and those who are considering surgery.   WHEN: This group meets on the 3rd Wednesday of each month from 5:00PM - 6:00PM virtually using [employer] Teams.   FACILITATOR: Led by Pam Kidd RD, LD, RN, the program's Clinical Coordinator.   TO REGISTER: Please contact the clinic via [employer] or call the nurse line directly at [phone] to inform our staff that you would like an invite sent to you and to let us know the email you would like the invite sent to. Prior to the meeting, a link with directions on how to join the meeting will be sent to you.    2022 Meetings  January 19: \"Let's Talk\" a time for the group to share.  February 16: \"Let's Talk\" a time for the group to share.  March 16: Guest Speakers: Psychologists, Teresa Sullivan, PhD,LP and Alia Colon, PsyD,LP  April 20: Guest Speaker: Health Dayana, [facility],CHES, CPT  May 18: Guest Speaker: Dietitian, Hugh Quiñonez, SHEILA, LP  Ana 15: \"Let's Talk\" a time for the group to share.  July 20: \"Let's Talk\" a time for the group to share.  August 17: TBA  September 21: TBA  October 19: Guest Speaker: Dr Wilbert Bradshaw MD Pulmonologist and Sleep Medicine Physician, \"Getting a Good Night's Sleep\".  November 16: TBA  December 21: TBA    [facility] - " "[place] Support Groups    Connections: Bariatric Care Support Group?  This is open to all [facility] (and those external to this program) pre- and post- operative bariatric surgery patients as well as their support system.   WHEN: This group meets the 2nd Tuesday of each month from 6:30 PM - 8:00 PM virtually using Microsoft Teams.   FACILITATOR: Led by Steven Thompson, Ph.D who is a Licensed Psychologist with the [facility] Comprehensive Weight Management Program.   TO REGISTER: Please send an email to Steven Thompson, Ph.D., LP [email or web address]?if you would like an invitation to the group and to learn about using [employer] Teams.    2022 Meetings  January 11: Deena Cralson, PharmD, Pharmacy Resident at [facility], \"Medications and Bariatric Surgery\".  February 8: Open Forum  March 8  April 12  May 10  Ana 14    Connections: Post-Operative Bariatric Surgery Support Group  This is a support group for [facility] bariatric patients (and those external to [facility]) who have had bariatric surgery and are at least 3 months post-surgery.  WHEN: This support group meets the 4th Wednesday of the month from 11:00 AM - 12:00 PM virtually using Microsoft Teams.   FACILITATOR: Led by Certified Bariatric Nurse, Kimberly Day RN.   TO REGISTER: Please send an email to Kimberly at [email or web address] if you would like an invitation to the group and to learn about using [employer] Teams.    2022 Meetings  January 26  February 23  March 23  April 27  May 25  Ana 22    [facility] Healthy Lifestyle Virtual Support Group    Healthy Lifestyle Virtual Support Group?  This is 60 minutes of small group guided discussion, support and resources. All are welcome who want a healthy lifestyle.  WHEN: This group meets monthly on a Friday from 12:30 PM - 1:30 PM virtually using Microsoft Teams.   FACILITATOR: Led by National Board Certified Health and " ", Kimberly Gregorio, [facility].   TO REGISTER: Please send an email to Kimberly [email or web address] to receive monthly invites to the group or if you have any questions about having a health .  Prior to the meeting, a link with directions on how to join the meeting will be sent to you.    2022 Meetings  January 21: Naomi Hlil MS, RN, CIC, CBN, \"Healthy Habits\"  February 25: Open Forum  March 18: \"Setting Limits and Boundaries\"  April 29: Tri Kim RD, \"Meal Planning Made Easy\"  May 20: Open Forum  June: To be determined          "

## 2022-02-03 NOTE — LETTER
"2/3/2022       RE: Pamella Landa  [address] [place] [address]     Dear Colleague,    Thank you for referring your patient, Pamella Landa, to the [facility] at [facility]. Please see a copy of my visit note below.    Pamella Landa is a 71 year old female who is being evaluated via a billable telephone visit.     The patient has been notified of following:     \"This telephone visit will be conducted via a call between you and your physician/provider. We have found that certain health care needs can be provided without the need for a physical exam.  This service lets us provide the care you need with a short phone conversation.  If a prescription is necessary we can send it directly to your pharmacy.  If lab work is needed we can place an order for that and you can then stop by our lab to have the test done at a later time.    Telephone visits are billed at different rates depending on your insurance coverage. During this emergency period, for some insurers they may be billed the same as an in-person visit.  Please reach out to your insurance provider with any questions.    If during the course of the call the physician/provider feels a telephone visit is not appropriate, you will not be charged for this service.\"    Patient has given verbal consent for Telephone visit?  Yes    How would you like to obtain your AVS? Mail    Phone call duration: 27 minutes    During this virtual visit the patient is located in [place], patient verifies this as the location during the entirety of this visit.     Nutrition Assessment  Reason For Visit:  Pamella Landa is a 71 year old female presents today for new re-[facility] nutrition visit. Pt with history of RNYGB 15 years ago.  Patient referred by CHEO Key on January 31, 2022.    Patient with Co-morbidities of obesity including:  Type II DM no  Renal Failure " "no  Sleep apnea no  Hypertension yes   Dyslipidemia yes  Joint pain no  Back pain yes  GERD no    Anthropometrics:  Pre-op Weight: 325 lbs  Current Weight:   Estimated body mass index is 36.69 kg/m  as calculated from the following:    Height as of 1/31/22: 1.575 m (5' 2\").    Weight as of 1/31/22: 91 kg (200 lb 9.6 oz).  Weight loss: 125 lbs    Current Vitamins/Minerals: MVI/minerals once daily, Calcium Citrate + Vitamin D (500 mg once per day), Vitamin B12 (1000 mcg/day), Vitamin D3 (5000 units/day), Vitron C (just ran out)    1/31/21 labs: B12: 1515 (H), Vitamin D: 28     Nutrition History:  Met with PA on 1/31, plan - Protein powder options given to patient today.  Need to minimize fruit and add protein to morning smoothie. Restarting topiramate.     Today she reports she is seldom eating snacks. Reports usually only having 1-2 meals per day. Small meals.   Does not tolerate milk. Cheese ok.   No Dumping syndrome. No issues with low BG.    She is working on weight loss after gradual weight regain.     Recent food recall:  Breakfast: smoothie (banana, apple, etc + apple/OJ juice; skip  Lunch: 1-2 pm a few bites rice/plantains/cassava   Dinner: skip  Snacks: none usually. Occ cheese, crackers and jelly/PB  Beverages: 1-2 cans diet soda, water (32-48 oz/day), 1 cup decaf coffee + brown sugar + powdered creamer      Dining out: none     Physical Activity:  Limited by back pain, cannot stand or sit for too long. Prior loved walking. She is doing PT for her back. Considering back pain.     Nutrition Prescription:  Grams Protein: 50-60 (minimum)  Amount of Fluid: 48-64 oz    Nutrition Diagnosis  Obesity r/t long history of positive energy balance aeb BMI >30.    Intervention  Materials/Education provided on maintenance dietary guidelines after bariatric surgery, portion sizes, eating pace and chewing well, snacking, separation of beverages from meals, vitamin and mineral supplements after weight loss surgery, and " protein needs. Discussed ways to optimize protein intake and discussed high-protein meal ideas. Reviewed high B12 and borderline low vitamin D labs with pt, encouraged she decrease B12 supplement, and increase calcium and MVI to meet guidelines post-RNYGB to help increase vitamin D levels.  Patient demonstrates understanding. Provided pt with list of goals and RD contact information.    Expected Engagement: good    Goals:  1) Increase protein intake. Aim for 60 gm /day.   - Eat 2-3 small meals per day. Eat protein foods first at your meal.  2) Relating to dietary supplements:   - Take 2 multivitamins per day   - Take 3 calcium supplements per day (separate from each other and iron supplement)   - Reduce B12 supplement to 500 mcg/day    Protein smoothie: <1 cup fruit + 1 serv protein powder + water  Meal/snack ideas:   - whole grain cracker with chicken salad  - 1 slice whole grain, sprouted bread with 2 oz salmon and low-fat cristobal  - 3 oz chicken + 1/4 cup rice/cassava/plantain    Keeping Up Your Diet after Weight Loss Surgery  [email or web address]      Follow-Up:  1 month, PRN    Time spent with patient: 27 minutes.  Hailey Ojeda, RD, LD

## 2022-02-07 ENCOUNTER — TELEPHONE (OUTPATIENT)
Dept: ENDOCRINOLOGY | Facility: CLINIC | Age: 72
End: 2022-02-07
Payer: COMMERCIAL

## 2022-03-24 DIAGNOSIS — E66.812 CLASS 2 SEVERE OBESITY WITH SERIOUS COMORBIDITY AND BODY MASS INDEX (BMI) OF 36.0 TO 36.9 IN ADULT, UNSPECIFIED OBESITY TYPE (H): ICD-10-CM

## 2022-03-24 DIAGNOSIS — Z98.84 BARIATRIC SURGERY STATUS: ICD-10-CM

## 2022-03-24 DIAGNOSIS — E66.01 CLASS 2 SEVERE OBESITY WITH SERIOUS COMORBIDITY AND BODY MASS INDEX (BMI) OF 36.0 TO 36.9 IN ADULT, UNSPECIFIED OBESITY TYPE (H): ICD-10-CM

## 2022-03-29 RX ORDER — TOPIRAMATE 25 MG/1
75 TABLET, FILM COATED ORAL AT BEDTIME
Qty: 90 TABLET | Refills: 0 | Status: SHIPPED | OUTPATIENT
Start: 2022-03-29 | End: 2022-08-15

## 2022-03-29 NOTE — TELEPHONE ENCOUNTER
"   Topiramate 25MG TABS 25mg at bedtime for week 1, 50mg at bedtime for 1 week, and 75mg at bedtime thereafter  Last Written Prescription Date:  1/31/22  Last Fill Quantity: 90,   # refills: 1  Last Office Visit : 1/31/22 \"Restart topiramate ramp to 75 mg\"  Future Office visit:  4/12/22 [place]. 4/27/22 [place]             "

## 2022-07-05 ENCOUNTER — TELEPHONE (OUTPATIENT)
Dept: PHARMACY | Facility: CLINIC | Age: 72
End: 2022-07-05

## 2022-07-05 NOTE — TELEPHONE ENCOUNTER
LVM, per provider 4pm appt today was overbooked and patient needs to reschedule. R/s to next available opening, 60 minute telephone new visit.

## 2022-08-09 DIAGNOSIS — E66.812 CLASS 2 SEVERE OBESITY WITH SERIOUS COMORBIDITY AND BODY MASS INDEX (BMI) OF 36.0 TO 36.9 IN ADULT, UNSPECIFIED OBESITY TYPE (H): ICD-10-CM

## 2022-08-09 DIAGNOSIS — Z98.84 BARIATRIC SURGERY STATUS: ICD-10-CM

## 2022-08-09 DIAGNOSIS — E66.01 CLASS 2 SEVERE OBESITY WITH SERIOUS COMORBIDITY AND BODY MASS INDEX (BMI) OF 36.0 TO 36.9 IN ADULT, UNSPECIFIED OBESITY TYPE (H): ICD-10-CM

## 2022-08-15 RX ORDER — TOPIRAMATE 25 MG/1
TABLET, FILM COATED ORAL
Qty: 90 TABLET | Refills: 0 | Status: SHIPPED | OUTPATIENT
Start: 2022-08-15

## 2022-08-15 NOTE — TELEPHONE ENCOUNTER
Topiramate 25MG TABS      Last Written Prescription Date:  3-29-22  Last Fill Quantity: 90,   # refills: 0  Last Office Visit : 1-31-22 ( RTC 3 M)  Future Office visit:  8-29-22    Routing refill request to provider for review/approval because:  Overdue lab: Cr, K, Cl, Anion gap  Gap in RF  2 previous no show appt.

## 2022-09-28 DIAGNOSIS — E66.01 CLASS 2 SEVERE OBESITY WITH SERIOUS COMORBIDITY AND BODY MASS INDEX (BMI) OF 36.0 TO 36.9 IN ADULT, UNSPECIFIED OBESITY TYPE (H): ICD-10-CM

## 2022-09-28 DIAGNOSIS — Z98.84 BARIATRIC SURGERY STATUS: ICD-10-CM

## 2022-09-28 DIAGNOSIS — E66.812 CLASS 2 SEVERE OBESITY WITH SERIOUS COMORBIDITY AND BODY MASS INDEX (BMI) OF 36.0 TO 36.9 IN ADULT, UNSPECIFIED OBESITY TYPE (H): ICD-10-CM

## 2022-10-03 RX ORDER — TOPIRAMATE 25 MG/1
TABLET, FILM COATED ORAL
Qty: 90 TABLET | Refills: 0 | OUTPATIENT
Start: 2022-10-03

## 2022-10-03 NOTE — TELEPHONE ENCOUNTER
Refill refused electronically: patient needs appointment.  No future visit.  Message was sent to Bariatric scheduling and FYI to [facility] surgery clinic nurse pool.

## 2022-10-03 NOTE — TELEPHONE ENCOUNTER
"Called and spoke with patient in regards to Topiramate request. Patient has no showed and canceled 8 times since last visit in January. Called pharmacy and verified that she last picked up prescription in August, so has been off for a couple weeks. Pharmacist states \"there is always a gap in medication refills\". Requested that patient see if her PCP can continue prescribing the medication, since she does not comply with her appointments with Amrita. Patient states she does want to continue the Topiramate and will ask her PCP to manage.   "

## 2023-06-29 NOTE — NURSING NOTE
"( No chief complaint on file.   )    ( Weight: 170 lb 9.6 oz )  ( Height: 5' 2\" )  ( BMI (Calculated): 31.27 )  ( Initial Weight: 325 lb )  ( Cumulative weight loss (lbs): 154.4 )  ( Last Visits Weight: 184 lb 1.6 oz )  ( Wt change since last visit (lbs): -13.5 )  (   )  (   )    ( BP: 132/70 )  (   )  ( Temp: 98.3  F (36.8  C) )  ( Temp src: Oral )  ( Pulse: (!) 48 )  (   )  ( SpO2: 100 % )    (   Patient Active Problem List   Diagnosis     Bariatric surgery status     Malabsorption    )  (   Current Outpatient Prescriptions   Medication Sig Dispense Refill     acetaminophen (TYLENOL) 500 MG tablet Take 500-1,000 mg by mouth       amitriptyline (ELAVIL) 10 MG tablet Take 10 mg by mouth       amLODIPine (NORVASC) 10 MG tablet Take 10 mg by mouth       benzonatate (TESSALON) 100 MG capsule Take 100 mg by mouth       calcium carb 1250 mg, 500 mg [place]/vitamin D 200 units (OSCAL WITH D) 500-200 MG-UNIT per tablet        cetirizine HCl 10 MG CAPS        Cholecalciferol (VITAMIN D) 2000 UNITS tablet Take 4,000 Units by mouth       cyanocobalamin (VITAMIN B12) 1000 MCG/ML injection Inject 1,000 mcg into the muscle       esomeprazole (NEXIUM) 40 MG CR capsule Take 40 mg by mouth       ferrous fumarate 65 mg, [place] FE,-Vitamin C 125 mg (VITRON C)  MG TABS tablet Take 1 tablet by mouth 3 times daily 90 tablet 1     FLUoxetine (PROZAC) 20 MG capsule Take 20 mg by mouth       fluticasone (FLONASE) 50 MCG/ACT spray 2 sprays       melatonin 1 MG TABS tablet Take 1 mg by mouth       metoprolol (TOPROL-XL) 25 MG 24 hr tablet TAKE 1/2 TABLET BY MOUTH EVERY DAY       nystatin (MYCOSTATIN) cream Apply to area under breasts twice daily for one week       ondansetron (ZOFRAN-ODT) 4 MG ODT tab I-2 tab BID PRN       pantoprazole (PROTONIX) 20 MG EC tablet Take 20 mg by mouth       senna-docusate (SENOKOT-S;PERICOLACE) 8.6-50 MG per tablet Take 1 tab by mouth twice daily as needed for constipation       sucralfate (CARAFATE) 1 GM " tablet Take 1 g by mouth       SUMAtriptan (IMITREX) 50 MG tablet Take 50 mg by mouth       topiramate (TOPAMAX) 25 MG tablet 25mg at bedtime for week 1, 50mg at bedtime for 1 week, and 75mg at bedtime thereafter 90 tablet 3     traZODone (DESYREL) 50 MG tablet 1-2 tablets Qhs  PRN      )  ( Diabetes Eval:    )    ( Pain Eval:  Data Unavailable )    ( Wound Eval:       )    (   History   Smoking Status     Never Smoker   Smokeless Tobacco     Never Used    )    ( Signed By:  Wily Cuevas; May 11, 2018; 8:04 AM )     Adbry Counseling: I discussed with the patient the risks of tralokinumab including but not limited to eye infection and irritation, cold sores, injection site reactions, worsening of asthma, allergic reactions and increased risk of parasitic infection.  Live vaccines should be avoided while taking tralokinumab. The patient understands that monitoring is required and they must alert us or the primary physician if symptoms of infection or other concerning signs are noted.

## 2023-08-15 ENCOUNTER — TRANSCRIBE ORDERS (OUTPATIENT)
Dept: OTHER | Age: 73
End: 2023-08-15

## 2023-08-15 DIAGNOSIS — H53.40 UNSPECIFIED VISUAL FIELD DEFECTS: Primary | ICD-10-CM

## 2023-09-25 RX ORDER — DOCUSATE SODIUM 100 MG/1
100 CAPSULE, LIQUID FILLED ORAL
COMMUNITY

## 2023-09-25 RX ORDER — HYDROCHLOROTHIAZIDE 12.5 MG/1
1 TABLET ORAL DAILY
COMMUNITY
Start: 2023-08-08 | End: 2024-08-07

## 2023-09-25 RX ORDER — FLUOXETINE 40 MG/1
40 CAPSULE ORAL
COMMUNITY

## 2023-09-25 RX ORDER — CETIRIZINE HYDROCHLORIDE 10 MG/1
TABLET ORAL
COMMUNITY
Start: 2022-12-13

## 2023-09-25 RX ORDER — ASPIRIN 81 MG/1
TABLET, CHEWABLE ORAL
COMMUNITY
Start: 2023-01-19

## 2023-09-25 RX ORDER — GABAPENTIN 300 MG/1
CAPSULE ORAL
COMMUNITY
Start: 2022-12-13

## 2023-09-25 RX ORDER — AZELASTINE HYDROCHLORIDE 0.5 MG/ML
1 SOLUTION/ DROPS OPHTHALMIC
COMMUNITY
Start: 2023-08-07

## 2023-09-25 RX ORDER — ATORVASTATIN CALCIUM 40 MG/1
TABLET, FILM COATED ORAL
COMMUNITY
Start: 2022-12-13

## 2023-09-25 RX ORDER — TOPIRAMATE 100 MG/1
TABLET, FILM COATED ORAL
COMMUNITY
Start: 2023-01-16

## 2023-09-25 NOTE — PROGRESS NOTES
Pamella Landa is a 73 year old female with the following diagnoses:   1. Unspecified visual field defects    2. Visual field defect         Patient was sent for consultation by Dr. Gaines for vision loss of unknown etiology.    HPI:    Pamella Landa presnts for blurry vision each eye which started almost a year ago. She reports that in the am her eyes do not feel blurry but near the evening her eyes are itchy, burning, and blurry. She also experiences tearing in the evening. Rarely gets headaches which started more than a year ago - sharp stabbing pain that doesn't last very long. Has a remote history of migraine disorder. No flashes or floaters or other visual concerns.     Independent historians:  Patient    Review of outside testing:  None    Review of outside clinical notes:  8/4/23 -- Visit with Dr. Gaines  Vision loss of unknown etiology. Best corrected visual acuity at previous clinic appointment was 20/80 OD, OS, and patient read 20/40 OD, OS at VF appointment, however, VF are severely restricted OU. Does not appear to be glaucoma, and retinal OCT scans were normal as well. Concern for potential optic nerve pathway disorder or degenerative disorder. [facility] place referral for [facility].     Past medical history:    Patient Active Problem List   Diagnosis    Bariatric surgery status    Malabsorption     HTN  Mood disorder  Insomnia  GERD    Medications:   acetaminophen  amLODIPine  aspirin  atorvastatin  benzonatate  calcium carbonate-vitamin D  cetirizine HCl Caps  cyanocobalamin  doxepin  esomeprazole  ferrous fumarate 65 mg ([place]. FE)-Vitamin C 125 mg Tabs  fluticasone  gabapentin  melatonin Tabs  metoprolol succinate ER  nystatin  ondansetron  pantoprazole  senna-docusate  SUMAtriptan  topiramate  traZODone  vitamin  s/Minerals Tabs  vitamin D3      Family history / social history:  Patient reports that her maternal grandfather had high blood pressure that affected his  vision.    Patient  reports that she has never smoked. She has never used smokeless tobacco. She reports that she does not drink alcohol and does not use drugs.       Exam:  Visual acuity 20/25 right eye 20/30 left eye.  Color vision 11/11 right eye and 11/11 left eye.  Pupils are round and briskly reactive to light without an afferent pupillary defect.  Intraocular pressure 16 right eye and 18 left eye.  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  Fundus exam normal.      Tests ordered and interpreted today:    Low Vision Central OU          Performed by: Shoshana   . Patient cooperation: Reliable   .   Good fix. Dilated after VF.     Right Eye  Reliability of the test: Poor   . Test Findings Free Text: Nonspecific losses   . Interpretation: Abnormal   . Plan: Monitor   . Interval: Initial   .     Left Eye  Reliability of the test: Poor   . Findings: Central scotoma   . Interpretation: Abnormal   . Plan: Monitor   . Interval: Initial   .          OCT Optic Nerve RNFL Spectralis OU (both eyes)          Performed by: mika   . Patient cooperation: Reliable   .     Right Eye  Reliability of the test: Good   . Test Findings: Normal   . Interpretation: Normal   . Plan: Monitor   .     Left Eye  Reliability of the test: Good   . Test Findings: Normal   . Interpretation: Normal   . Plan: Monitor   .              Discussion of management / interpretation with another provider:   None    Assessment/Plan:   It is my impression that patient has decreased vision RIGHT eye.  Her visual fields show a dense central scotoma in the right eye.  When we check her visual acuity she is eccentrically viewing through the right eye.  Interestingly her color vision is normal.  There is no evidence of anything that looks like a retinal artery occlusion on her OCT.  She has ganglion cell layer loss on OPTICAL COHERENCE TOMOGRAPHY, but her retinal nerve fiber layer is normal.  I will obtain MRI orbits.  If this is normal,  then will follow up in 6 months.     Her visual acuity is much better on my visit than it was on her last visit with her eye doctor.  She has an early tear break-up time and certainly some of this could be explainable by tear film insufficiency.  I will start her on xiidra twice a day in both eyes           Attending Physician Attestation:  Complete documentation of historical and exam elements from today's encounter can be found in the full encounter summary report (not reduplicated in this progress note).  I personally obtained the chief complaint(s) and history of present illness.  I confirmed and edited as necessary the review of systems, past medical/surgical history, family history, social history, and examination findings as documented by others; and I examined the patient myself.  I personally reviewed the relevant tests, images, and reports as documented above.  I formulated and edited as necessary the assessment and plan and discussed the findings and management plan with the patient and family. I personally reviewed the ophthalmic test(s) associated with this encounter, agree with the interpretation(s) as documented by the resident/fellow, and have edited the corresponding report(s) as necessary.  - Henrry Alcantara MD, PGY3  Ophthalmology Resident  [facility]

## 2023-09-26 ENCOUNTER — OFFICE VISIT (OUTPATIENT)
Dept: OPHTHALMOLOGY | Facility: CLINIC | Age: 73
End: 2023-09-26
Attending: OPHTHALMOLOGY
Payer: COMMERCIAL

## 2023-09-26 DIAGNOSIS — H47.211 PRIMARY OPTIC ATROPHY OF RIGHT EYE: Primary | ICD-10-CM

## 2023-09-26 DIAGNOSIS — H53.40 VISUAL FIELD DEFECT: ICD-10-CM

## 2023-09-26 DIAGNOSIS — H53.40 VISUAL FIELD DEFECT: Primary | ICD-10-CM

## 2023-09-26 DIAGNOSIS — H53.40 UNSPECIFIED VISUAL FIELD DEFECTS: ICD-10-CM

## 2023-09-26 PROCEDURE — 99204 OFFICE O/P NEW MOD 45 MIN: CPT | Mod: GC | Performed by: OPHTHALMOLOGY

## 2023-09-26 PROCEDURE — 92133 CPTRZD OPH DX IMG PST SGM ON: CPT | Performed by: OPHTHALMOLOGY

## 2023-09-26 PROCEDURE — 92083 EXTENDED VISUAL FIELD XM: CPT | Performed by: OPHTHALMOLOGY

## 2023-09-26 PROCEDURE — G0463 HOSPITAL OUTPT CLINIC VISIT: HCPCS | Performed by: OPHTHALMOLOGY

## 2023-09-26 ASSESSMENT — REFRACTION_WEARINGRX
OD_ADD: +2.50
OD_CYLINDER: +0.25
OS_CYLINDER: +1.25
OD_AXIS: 015
OS_SPHERE: PLANO
OD_CYLINDER: SPHERE
OD_SPHERE: PLANO
OS_ADD: +2.50
OS_SPHERE: PLANO
OS_AXIS: 090
OD_SPHERE: +1.50
OS_CYLINDER: SPHERE

## 2023-09-26 ASSESSMENT — CONF VISUAL FIELD
METHOD: COUNTING FINGERS
OS_SUPERIOR_NASAL_RESTRICTION: 3
OD_SUPERIOR_TEMPORAL_RESTRICTION: 3
OS_SUPERIOR_TEMPORAL_RESTRICTION: 3

## 2023-09-26 ASSESSMENT — VISUAL ACUITY
OD_CC: 20/25
OD_CC+: -3
CORRECTION_TYPE: GLASSES
METHOD: SNELLEN - LINEAR
OS_CC: 20/30

## 2023-09-26 ASSESSMENT — CUP TO DISC RATIO
OS_RATIO: 0.3
OD_RATIO: 0.4

## 2023-09-26 ASSESSMENT — EXTERNAL EXAM - LEFT EYE: OS_EXAM: NORMAL

## 2023-09-26 ASSESSMENT — TONOMETRY
OD_IOP_MMHG: 16
IOP_METHOD: ICARE
OS_IOP_MMHG: 18

## 2023-09-26 ASSESSMENT — EXTERNAL EXAM - RIGHT EYE: OD_EXAM: NORMAL

## 2023-09-26 ASSESSMENT — LEVATOR FUNCTION
OD_LEVATOR: 16
OS_LEVATOR: 17

## 2023-09-26 NOTE — PATIENT INSTRUCTIONS
Recommended regimen:  Artificial tears 4x/day - better to use preservative free if using more than 4x/day  Artificial tear ointment every night (the last thing you do before you go to bed)  Warm compress twice a day at least - either a warm towel or a clean sock filled with rice and then microwaved until it's warm  Can consider omega-3 fatty acids - either fish oil or flaxseed oil  Treat allergy symptoms as needed (see below for suggestions)    Artificial Tears 4-5x/day:  Systane  Refresh  Tears Naturale  Genteal  Optive  Soothe  Hypotears    All of these are good products.    DO NOT USE VISINE OR CLEAR EYES.    Artificial Tear Ointment  Use at least nightly, if able use more often - should be the last thing you use before you go to bed    Warm compresses  Perform 2 times daily, or as ordered by your doctor   1.. Use a commercially available hot pack   2. Dry rice in a clean sock and microwave until hot       - Place warm compress on closed eyelids for about 5 minutes       - Gently massage eye lids for 30 seconds    Eyelid scrubs  - Use baby shampoo to gently scrub your eyelids daily   - Clean from near the nose to the outside of the eye and then clean with a clean rag from the nose to the outside of the eye    Omega-3 fatty acids  fish oil or flaxseed oil capsules 2x/day      Allergy Medications  Claritin (loratadine) 1x/day - least expensive, works 1-2 seasons, then less effective  Allegra (fexofenadine) 2x/day (high dose - 180mg ok 1x/day)  Zyrtec (cetirizine) 1x/day - most expensive, most effective, usually works well for many years    Allergy Eyedrops  Zaditor (over-the-counter, generic name = ketotifen)  Patanol (prescription, generic name = olopatadine)    TRY WARM COMPRESSES ON THE EYES FOR 5 MINUTES IN THE AM AND 5 MINUTES IN THE PM.    YOU CAN EITHER BY ONE AT THE DRUG STORE OR MAKE YOUR OWN:    1. TAKE A HANDFUL OF UNCOOKED RICE AND PLACE IN A CLEAN SOCK  2. TIE THE END OF THE SOCK IN A KNOT  3.   MICROWAVE FOR 30-45 SECONDS (TEST TO MAKE SURE IT'S NOT TOO HOT)  4.  PLACE OVER CLOSED EYES FOR 5 MINUTES.  5.  WASH EYELASHES WITH A CLEAN WASH CLOTH WHEN YOU ARE DONE    THIS WILL HELP YOU GET A HEALTHIER OIL LAYER TO KEEP YOUR TEARS FROM EVAPORATING.    ALSO USE ARTIFICIAL TEARS 4 TIMES DAILY IN BOTH EYES.

## 2023-09-26 NOTE — LETTER
2023         RE:  :  MRN: Pamella Landa  1950  [id number]     Dear Dr. Gaines,    Thank you for asking me to see your very pleasant patient, Pamella Landa, in neuro-ophthalmic consultation.  I would like to thank you for sending your records and I have summarized them in the history of present illness. My assessment and plan are below.  For further details, please see my attached clinic note.      Pamella Landa is a 73 year old female with the following diagnoses:   1. Unspecified visual field defects    2. Visual field defect         Patient was sent for consultation by Dr. Gaines for vision loss of unknown etiology.    HPI:    Pamella Landa presnts for blurry vision each eye which started almost a year ago. She reports that in the am her eyes do not feel blurry but near the evening her eyes are itchy, burning, and blurry. She also experiences tearing in the evening. Rarely gets headaches which started more than a year ago - sharp stabbing pain that doesn't last very long. Has a remote history of migraine disorder. No flashes or floaters or other visual concerns.     Independent historians:  Patient    Review of outside testing:  None    Review of outside clinical notes:  23 -- Visit with Dr. Gaines  Vision loss of unknown etiology. Best corrected visual acuity at previous clinic appointment was 20/80 OD, OS, and patient read 20/40 OD, OS at VF appointment, however, VF are severely restricted OU. Does not appear to be glaucoma, and retinal OCT scans were normal as well. Concern for potential optic nerve pathway disorder or degenerative disorder. Will place referral for [facility].     Past medical history:    Patient Active Problem List   Diagnosis    Bariatric surgery status    Malabsorption     HTN  Mood disorder  Insomnia  GERD    Medications:   acetaminophen  amLODIPine  aspirin  atorvastatin  benzonatate  calcium carbonate-vitamin D  cetirizine HCl  Caps  cyanocobalamin  doxepin  esomeprazole  ferrous fumarate 65 mg ([place]. FE)-Vitamin C 125 mg Tabs  fluticasone  gabapentin  melatonin Tabs  metoprolol succinate ER  nystatin  ondansetron  pantoprazole  senna-docusate  SUMAtriptan  topiramate  traZODone  vitamin  s/Minerals Tabs  vitamin D3      Family history / social history:  Patient reports that her maternal grandfather had high blood pressure that affected his vision.    Patient  reports that she has never smoked. She has never used smokeless tobacco. She reports that she does not drink alcohol and does not use drugs.       Exam:  Visual acuity 20/25 right eye 20/30 left eye.  Color vision 11/11 right eye and 11/11 left eye.  Pupils are round and briskly reactive to light without an afferent pupillary defect.  Intraocular pressure 16 right eye and 18 left eye.  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  Fundus exam normal.      Tests ordered and interpreted today:    Low Vision Central OU          Performed by: Shoshana   . Patient cooperation: Reliable   .   Good fix. Dilated after VF.     Right Eye  Reliability of the test: Poor   . Test Findings Free Text: Nonspecific losses   . Interpretation: Abnormal   . Plan: Monitor   . Interval: Initial   .     Left Eye  Reliability of the test: Poor   . Findings: Central scotoma   . Interpretation: Abnormal   . Plan: Monitor   . Interval: Initial   .          OCT Optic Nerve RNFL Spectralis OU (both eyes)          Performed by: mika   . Patient cooperation: Reliable   .     Right Eye  Reliability of the test: Good   . Test Findings: Normal   . Interpretation: Normal   . Plan: Monitor   .     Left Eye  Reliability of the test: Good   . Test Findings: Normal   . Interpretation: Normal   . Plan: Monitor   .              Discussion of management / interpretation with another provider:   None    Assessment/Plan:   It is my impression that patient has decreased vision RIGHT eye.  Her visual fields  show a dense central scotoma in the right eye.  When we check her visual acuity she is eccentrically viewing through the right eye.  Interestingly her color vision is normal.  There is no evidence of anything that looks like a retinal artery occlusion on her OCT.  She has ganglion cell layer loss on OPTICAL COHERENCE TOMOGRAPHY, but her retinal nerve fiber layer is normal.  I will obtain MRI orbits.  If this is normal, then will follow up in 6 months.     Her visual acuity is much better on my visit than it was on her last visit with her eye doctor.  She has an early tear break-up time and certainly some of this could be explainable by tear film insufficiency.  I will start her on xiidra twice a day in both eyes         Again, thank you for allowing me to participate in the care of your patient.      Sincerely,    Henrry Velasco MD  Professor  Ophthalmology Residency   Director of Neuro-Ophthalmology  [employer] Endowed Chair  Departments of Ophthalmology, Neurology, and Neurosurgery  [facility] [address] [place] [address]  [id number]  T - [phone]  F - [phone]  [email or web address]      CC: Jennifer Gaines MD  [address]  [place] [address]  Via Fax: [phone]

## 2023-09-26 NOTE — NURSING NOTE
"Chief Complaint(s) and History of Present Illness(es)       Vision Changes Ou    In both eyes.  Charactertized as  blurred vision.  Severity is moderate.  Occurring constantly.  Since onset it is gradually worsening.  Associated symptoms include eye pain, tearing and itching.  Negative for headache, flashes and floaters. Additional comments: Pamella Landa is a 73 year old female sent for consultation by Dr. Gaines for vision changes.     Pamella reports bilateral blurry vision, has been ongoing \"for a long time.\"  During the evenings, eyes do feel irritated, burning, tearing, and itching.  She denies flashes and floaters.  Has hx of migraines but none recently.  No double vision.   GELY Carlos 9/26/2023 11:57 AM                   "

## 2023-10-26 ENCOUNTER — TELEPHONE (OUTPATIENT)
Dept: OPHTHALMOLOGY | Facility: CLINIC | Age: 73
End: 2023-10-26
Payer: COMMERCIAL

## 2023-10-26 NOTE — TELEPHONE ENCOUNTER
[facility] Call Center    Phone Message    May a detailed message be left on voicemail: yes     Reason for Call: Other: Patient states that Dr. Gaines advised her to call Dr. Velasco office to get scheduled for a CT scan, (if I understand her correctly).  Please call.  Thank you.     Action Taken: Message routed to:  [facility] ([facility] Ophthalmology    Travel Screening: Not Applicable

## 2023-10-26 NOTE — TELEPHONE ENCOUNTER
Spoke to patient needs to reschedule MRI. Rescheduled and provided imaging scheduling number in order to reschedule if that time does not work for her.     Sara Poole on 10/26/2023 at 4:48 PM

## 2023-11-07 ENCOUNTER — HOSPITAL ENCOUNTER (OUTPATIENT)
Dept: MRI IMAGING | Facility: CLINIC | Age: 73
Discharge: HOME | End: 2023-11-07
Attending: OPHTHALMOLOGY | Admitting: OPHTHALMOLOGY
Payer: COMMERCIAL

## 2023-11-07 DIAGNOSIS — H47.211 PRIMARY OPTIC ATROPHY OF RIGHT EYE: ICD-10-CM

## 2023-11-07 PROCEDURE — 70553 MRI BRAIN STEM W/O & W/DYE: CPT | Mod: 26 | Performed by: RADIOLOGY

## 2023-11-07 PROCEDURE — 255N000002 HC RX 255 OP 636: Mod: JZ | Performed by: OPHTHALMOLOGY

## 2023-11-07 PROCEDURE — A9585 GADOBUTROL INJECTION: HCPCS | Mod: JZ | Performed by: OPHTHALMOLOGY

## 2023-11-07 PROCEDURE — 999N000127 HC STATISTIC PERIPHERAL IV START W US GUIDANCE

## 2023-11-07 PROCEDURE — 70553 MRI BRAIN STEM W/O & W/DYE: CPT

## 2023-11-07 RX ORDER — GADOBUTROL 604.72 MG/ML
9.1 INJECTION INTRAVENOUS ONCE
Status: DISCONTINUED | OUTPATIENT
Start: 2023-11-07 | End: 2023-11-07

## 2023-11-07 RX ORDER — GADOBUTROL 604.72 MG/ML
9 INJECTION INTRAVENOUS ONCE
Status: COMPLETED | OUTPATIENT
Start: 2023-11-07 | End: 2023-11-07

## 2023-11-07 RX ADMIN — GADOBUTROL 9 ML: 604.72 INJECTION INTRAVENOUS at 06:49

## 2023-11-08 ENCOUNTER — TELEPHONE (OUTPATIENT)
Dept: OPHTHALMOLOGY | Facility: CLINIC | Age: 73
End: 2023-11-08
Payer: COMMERCIAL

## 2023-11-08 NOTE — TELEPHONE ENCOUNTER
Left voicemail for patient that MRI was unremarkable.  Dr. Velasco would like to see the patient in 6 months or follow up sooner as needed for worsening symptoms.     Sara Poole on 11/8/2023 at 8:45 AM

## 2023-11-13 ENCOUNTER — OFFICE VISIT (OUTPATIENT)
Dept: OPHTHALMOLOGY | Facility: CLINIC | Age: 73
End: 2023-11-13
Attending: OPHTHALMOLOGY
Payer: COMMERCIAL

## 2023-11-13 DIAGNOSIS — H53.10 SUBJECTIVE VISUAL DISTURBANCE: ICD-10-CM

## 2023-11-13 DIAGNOSIS — H04.123 DRY EYE SYNDROME OF BOTH EYES: ICD-10-CM

## 2023-11-13 DIAGNOSIS — H53.10 SUBJECTIVE VISUAL DISTURBANCE: Primary | ICD-10-CM

## 2023-11-13 DIAGNOSIS — H53.40 VISUAL FIELD DEFECT: ICD-10-CM

## 2023-11-13 DIAGNOSIS — H53.40 VISUAL FIELD DEFECT: Primary | ICD-10-CM

## 2023-11-13 PROCEDURE — 92083 EXTENDED VISUAL FIELD XM: CPT | Performed by: OPHTHALMOLOGY

## 2023-11-13 PROCEDURE — 68761 CLOSE TEAR DUCT OPENING: CPT | Performed by: OPHTHALMOLOGY

## 2023-11-13 PROCEDURE — G0463 HOSPITAL OUTPT CLINIC VISIT: HCPCS | Mod: 25 | Performed by: OPHTHALMOLOGY

## 2023-11-13 PROCEDURE — 92014 COMPRE OPH EXAM EST PT 1/>: CPT | Mod: 25 | Performed by: OPHTHALMOLOGY

## 2023-11-13 ASSESSMENT — REFRACTION_WEARINGRX
OS_SPHERE: PLANO
OS_CYLINDER: SPHERE
OD_CYLINDER: SPHERE
OD_SPHERE: PLANO

## 2023-11-13 ASSESSMENT — VISUAL ACUITY
OD_SC: 20/25
METHOD: SNELLEN - LINEAR
OS_SC: 20/30

## 2023-11-13 ASSESSMENT — REFRACTION_MANIFEST
OD_SPHERE: PLANO
OS_CYLINDER: SPHERE
OS_SPHERE: PLANO
OD_CYLINDER: SPHERE
OD_ADD: +2.50
OS_ADD: +2.50

## 2023-11-13 ASSESSMENT — CONF VISUAL FIELD
OD_SUPERIOR_TEMPORAL_RESTRICTION: 0
OS_SUPERIOR_NASAL_RESTRICTION: 3
OD_INFERIOR_NASAL_RESTRICTION: 0
OD_NORMAL: 1
OS_SUPERIOR_TEMPORAL_RESTRICTION: 3
OD_SUPERIOR_NASAL_RESTRICTION: 0
OD_INFERIOR_TEMPORAL_RESTRICTION: 0
METHOD: COUNTING FINGERS

## 2023-11-13 ASSESSMENT — EXTERNAL EXAM - LEFT EYE: OS_EXAM: NORMAL

## 2023-11-13 ASSESSMENT — TONOMETRY
OD_IOP_MMHG: 10
IOP_METHOD: ICARE
OS_IOP_MMHG: 12

## 2023-11-13 ASSESSMENT — EXTERNAL EXAM - RIGHT EYE: OD_EXAM: NORMAL

## 2023-11-13 ASSESSMENT — CUP TO DISC RATIO
OS_RATIO: 0.3
OD_RATIO: 0.4

## 2023-11-13 NOTE — PROGRESS NOTES
Assessment & Plan     Pamella Landa is a 73 year old female with the following diagnoses:   1. Visual field defect    2. Subjective visual disturbance    3. Dry eye syndrome of both eyes         Pamella Landa is presenting for follow up for decreased vision right eye. Our last visit 9/26/23 she had a dense central scotoma right eye with normal color vision. We obtained an MRI which was normal. We discussed following up in 6 months and started her on Xiidra for ocular surface concerns.    She is not using Xiidra - she stopped about 10/31/23. She has been doing warm compresses and no artificial tears. She started an artificial tear ointment 10/31/23 BID and she has noted minimal improvement. She went to see Dr. Caraballo for reflux concerns who looked at red reflex bilaterally and noticed dark spots right eye and recommended she return to see eye doctor.     Today she reports that her visual symptoms are stable since last visit. Her burning, irritation, itching is no better and no worse. She has not noted new changes to her vision.    Her afferent visual function is stable today with 20/25 vision right eye and 20/30 left eye, full color vision on Ishihara testing. She has a persistent dense central scotoma right eye which is much less pronounced today.    Regarding her vision loss, there is no cerebral etiology identified. There is no nerve thinning or cupping to suggest an optic nerve etiology.     She continues to have significant ocular surface symptoms which have been only somewhat responsive to medical therapy. Recommend punctal plugs today and follow up           Attending Physician Attestation:  Complete documentation of historical and exam elements from today's encounter can be found in the full encounter summary report (not reduplicated in this progress note).  I personally obtained the chief complaint(s) and history of present illness.  I confirmed and edited as necessary the review of  systems, past medical/surgical history, family history, social history, and examination findings as documented by others; and I examined the patient myself.  I personally reviewed the relevant tests, images, and reports as documented above.  I formulated and edited as necessary the assessment and plan and discussed the findings and management plan with the patient and family. I personally reviewed the ophthalmic test(s) associated with this encounter, agree with the interpretation(s) as documented by the resident/fellow, and have edited the corresponding report(s) as necessary. I was present for the entire procedure(s). - Henrry Velasco MD         Thank you for entrusting us with your care  Mayra Alcantara MD, PGY3  Ophthalmology Resident  [facility]

## 2024-01-10 NOTE — LETTER
"10/19/2021       RE: Pamella Landa  [address] [place] [address]     Dear Colleague,    Thank you for referring your patient, Pamella Landa, to the [facility] [place] [facility]. Please see a copy of my visit note below.    Pamella is a 71 year old who is being evaluated via a billable telephone visit.      What phone number would you like to be contacted at? [phone]  How would you like to obtain your AVS? MyChart    Return Medical Weight Management Note     Pamella Landa  MRN:  [id number]  :  1950  FRANCESCO:  10/19/2021    Dear Colleagues,    I had the pleasure of seeing your patient Pamella Landa. She is a 71 year old female who I am continuing to see for treatment of obesity related to:       2/15/2017   I have the following health issues associated with obesity: Hypertension (High Blood Pressure), Hyperlipidemia (High Cholesterol), Back Pain   I have the following symptoms associated with obesity: Back Pain       INTERVAL HISTORY:  1st time I am seeing this patient, kaden maldonado/ Amrita Adair on 2020  15 yrs s/p RYGB  Initial Weight: 325 lb  Lowest weight after gastric bypass: 150 lbs stable for many years  Was on topiramate to 100mg BID . . .     Admission 10/13/21, d/c'd to TCU:  Acute toxic encephalopathy  Unintentional overdose  Polypharmacy  Chronic pain syndrome  Constipation  Sinus bradycardia  HTN  Right great toe pain  \"As outpatient, gabapentin dose recently increased and baclofen was also started. She also takes trazodone, cetirizine, doxepin, and topiramate.  shows no opioid or benzo prescriptions. Work-up negative for CVA, cardiac, metabolic, infectious causes. Mental status drastically improved with medication washout.  - Holding sedating medications at discharge (baclofen, doxepin, gabapentin, topiramate, trazodone). Discussed with PharmD; baclofen is new enough medication " ----- Message from Luanne Jack sent at 1/10/2024 12:52 PM CST -----  Contact: mom  Type:  Sooner Appointment Request    Caller is requesting a sooner appointment.  Caller declined first available appointment listed below.  Caller will not accept being placed on the waitlist and is requesting a message be sent to doctor.  Name of Caller:pt mom   When is the first available appointment?None available   Symptoms:Reschedule 01/08 appointment   Would the patient rather a call back or a response via [facility]? Call   Best Call Back Number: [phone]  Additional Information:         "that we should not have to worry about withdrawal.  - Messaged PCP Dr. Marina to have close follow-up as anticipate may have worsening of chronic pain off medications.\"    Is at TCU now and they are providing diet. She is going home today.    CURRENT WEIGHT:   198 lbs 0 oz    Initial Weight (lbs): 325 lbs  Last Visits Weight: 87.2 kg (192 lb 4.8 oz)  Cumulative weight loss (lbs): 127  Weight Loss Percentage: 39.08%    Changes and Difficulties 10/19/2021   I have made the following changes to my diet since my last visit: No changes.   With regards to my diet, I am still struggling with: Eating 1.5 meals and watching what she eats but still gaining weight   I have made the following changes to my activity/exercise since my last visit: No changes.   With regards to my activity/exercise, I am still struggling with: Trouble breathing when walking.       VITALS:  Ht 1.575 m (5' 2\")   Wt 89.8 kg (198 lb)   BMI 36.21 kg/m       EXAM:  There were no vitals taken for this virtual visit  Gen: calm, nad, pleasant and conversant  Neuro: A&O    LABS:  Creatinine   Date Value Ref Range Status   11/11/2020 0.97 0.52 - 1.04 mg/dL Final     Lab Results   Component Value Date    A1C 5.6 02/15/2017    A1C 5.4 08/13/2014     TSH   Date Value Ref Range Status   12/18/2007 1.25 0.4 - 5.0 mU/L Final       MEDICATIONS: MEDICATIONS IN THIS CHART MAY NOT BE ACCURATE (patient is in a TCU at time of this visit).    Current Outpatient Medications   Medication Sig Dispense Refill     acetaminophen (TYLENOL) 500 MG tablet Take 500-1,000 mg by mouth       amitriptyline (ELAVIL) 10 MG tablet Take 10 mg by mouth       amLODIPine (NORVASC) 10 MG tablet Take 10 mg by mouth       aspirin (ASA) 325 MG tablet Take 325 mg by mouth       atorvastatin (LIPITOR) 10 MG tablet TK 1 T PO D HS       benzonatate (TESSALON) 100 MG capsule Take 100 mg by mouth       buPROPion (WELLBUTRIN XL) 150 MG 24 hr tablet Take 150 mg by mouth       calcium carb 1250 mg, " 500 mg [place],/vitamin D 200 units (OSCAL WITH D) 500-200 MG-UNIT per tablet        cetirizine HCl 10 MG CAPS        Cholecalciferol (VITAMIN D) 2000 UNITS tablet Take 4,000 Units by mouth       cyanocobalamin (VITAMIN B12) 1000 MCG/ML injection Inject 1,000 mcg into the muscle       doxepin (SINEQUAN) 10 MG capsule Take 10 mg by mouth       esomeprazole (NEXIUM) 40 MG CR capsule Take 40 mg by mouth       ferrous fumarate 65 mg, [place]. FE,-Vitamin C 125 mg (VITRON C)  MG TABS tablet Take 1 tablet by mouth 3 times daily 90 tablet 1     fluticasone (FLONASE) 50 MCG/ACT spray 2 sprays       gabapentin (NEURONTIN) 100 MG capsule Take 100 mg by mouth       losartan (COZAAR) 25 MG tablet Take 25 mg by mouth       melatonin 1 MG TABS tablet Take 1 mg by mouth       metoprolol (TOPROL-XL) 25 MG 24 hr tablet TAKE 1/2 TABLET BY MOUTH EVERY DAY       nystatin (MYCOSTATIN) cream Apply to area under breasts twice daily for one week       ondansetron (ZOFRAN-ODT) 4 MG ODT tab I-2 tab BID PRN       pantoprazole (PROTONIX) 20 MG EC tablet Take 20 mg by mouth       senna-docusate (SENOKOT-S;PERICOLACE) 8.6-50 MG per tablet Take 1 tab by mouth twice daily as needed for constipation       SUMAtriptan (IMITREX) 50 MG tablet Take 50 mg by mouth       topiramate (TOPAMAX) 100 MG tablet Take 1 tablet (100 mg) by mouth 2 times daily 60 tablet 3     traZODone (DESYREL) 50 MG tablet 1-2 tablets Qhs  PRN       vitamin  s/Minerals TABS Take 1 tablet by mouth         Weight Loss Medication History Reviewed With Patient 10/19/2021   Which weight loss medications are you currently taking on a regular basis?  None   Are you having any side effects from the weight loss medication that we have prescribed you? No       ASSESSMENT:   Obesity, recent admission for unintentional medication overdose, topiramate & other meds on hold 2/2 that admission, continue to hold topiramate until she sees Pharm D for MTM and primary care MD  Patient has a scale  "now and can weigh self & is able to make changes to diet since is being discharged from TCU today  Body mass index is 36.21 kg/m .    PLAN:     Patient Instructions:    (sent via Letter, pt. not on My Chart yet)    Nice to talk with you today in virtual clinic    See your primary care physician within 1-2 weeks of discharge from TCU as you may be due to labs, medication review, etc.    Food goal: 1,000 (REE 1,483) calorie food plan using meal replacements such as Lean Cuisines, Healthy Choice, Smart Ones, Weight Watchers and supplement with fresh fruits and veggies and keep a daily food journal  Ht 1.575 m (5' 2\")   Wt 89.8 kg (198 lb)   BMI 36.21 kg/m      Activity goal: Start walking program daily as able, You may want to purchase a reclining exercise bike for home use to limit impact on weight bearing joints to prevent pain and allow you to do cardio fitness to reduce your risk of death due to cardiovascular disease and to promote weight loss.    Weight goal: weigh self 2x weekly and lose 1-2 lbs weekly    Medication goal: Please continue to hold topiramate until you see Dr. Karol Sweeney, Pharm D re: medication mgmt. Some of your medications may have drug-drug interactions.    RTC: quarterly or as needed     Please use [facility] to schedule your appointment(s) or call [phone] to schedule in [facility]      [facility],  Dr. Makenzie Dumont MD, MPH  Endocrinologist      Thad Zepeda, EMT  Surgery Clinic    Phone call duration: 7 minutes    Total Time: 25 min spent on chart review, evaluation, management, counseling, education, & motivational interviewing on the day of encounter      "

## 2024-03-14 DIAGNOSIS — H53.40 VISUAL FIELD DEFECT: Primary | ICD-10-CM

## (undated) RX ORDER — THIAMINE HYDROCHLORIDE 100 MG/ML
INJECTION, SOLUTION INTRAMUSCULAR; INTRAVENOUS
Status: DISPENSED
Start: 2017-03-31